# Patient Record
Sex: MALE | Race: WHITE | Employment: FULL TIME | URBAN - METROPOLITAN AREA
[De-identification: names, ages, dates, MRNs, and addresses within clinical notes are randomized per-mention and may not be internally consistent; named-entity substitution may affect disease eponyms.]

---

## 2021-11-15 ENCOUNTER — APPOINTMENT (OUTPATIENT)
Dept: CT IMAGING | Age: 46
End: 2021-11-15
Attending: EMERGENCY MEDICINE
Payer: COMMERCIAL

## 2021-11-15 ENCOUNTER — HOSPITAL ENCOUNTER (EMERGENCY)
Age: 46
Discharge: HOME | End: 2021-11-16
Attending: EMERGENCY MEDICINE
Payer: COMMERCIAL

## 2021-11-15 DIAGNOSIS — R10.10 UPPER ABDOMINAL PAIN: Primary | ICD-10-CM

## 2021-11-15 LAB
ABO + RH BLD: NORMAL
ALBUMIN SERPL-MCNC: 3.8 G/DL (ref 3.5–5)
ALBUMIN/GLOB SERPL: 1.1 {RATIO} (ref 1.2–3.5)
ALP SERPL-CCNC: 63 U/L (ref 50–136)
ALT SERPL-CCNC: 23 U/L (ref 12–65)
ANION GAP SERPL CALC-SCNC: 4 MMOL/L (ref 7–16)
AST SERPL-CCNC: 11 U/L (ref 15–37)
BASOPHILS # BLD: 0 K/UL (ref 0–0.2)
BASOPHILS NFR BLD: 1 % (ref 0–2)
BILIRUB SERPL-MCNC: 0.9 MG/DL (ref 0.2–1.1)
BLOOD GROUP ANTIBODIES SERPL: NORMAL
BUN SERPL-MCNC: 18 MG/DL (ref 6–23)
CALCIUM SERPL-MCNC: 8.3 MG/DL (ref 8.3–10.4)
CHLORIDE SERPL-SCNC: 105 MMOL/L (ref 98–107)
CO2 SERPL-SCNC: 30 MMOL/L (ref 21–32)
CREAT SERPL-MCNC: 0.91 MG/DL (ref 0.8–1.5)
DIFFERENTIAL METHOD BLD: ABNORMAL
EOSINOPHIL # BLD: 0.2 K/UL (ref 0–0.8)
EOSINOPHIL NFR BLD: 2 % (ref 0.5–7.8)
ERYTHROCYTE [DISTWIDTH] IN BLOOD BY AUTOMATED COUNT: 12.8 % (ref 11.9–14.6)
GLOBULIN SER CALC-MCNC: 3.4 G/DL (ref 2.3–3.5)
GLUCOSE SERPL-MCNC: 89 MG/DL (ref 65–100)
HCT VFR BLD AUTO: 43.3 % (ref 41.1–50.3)
HGB BLD-MCNC: 15 G/DL (ref 13.6–17.2)
IMM GRANULOCYTES # BLD AUTO: 0 K/UL (ref 0–0.5)
IMM GRANULOCYTES NFR BLD AUTO: 0 % (ref 0–5)
LIPASE SERPL-CCNC: 105 U/L (ref 73–393)
LYMPHOCYTES # BLD: 1.6 K/UL (ref 0.5–4.6)
LYMPHOCYTES NFR BLD: 22 % (ref 13–44)
MAGNESIUM SERPL-MCNC: 2.3 MG/DL (ref 1.8–2.4)
MCH RBC QN AUTO: 29.8 PG (ref 26.1–32.9)
MCHC RBC AUTO-ENTMCNC: 34.6 G/DL (ref 31.4–35)
MCV RBC AUTO: 86.1 FL (ref 79.6–97.8)
MONOCYTES # BLD: 0.7 K/UL (ref 0.1–1.3)
MONOCYTES NFR BLD: 10 % (ref 4–12)
NEUTS SEG # BLD: 4.7 K/UL (ref 1.7–8.2)
NEUTS SEG NFR BLD: 65 % (ref 43–78)
NRBC # BLD: 0 K/UL (ref 0–0.2)
PLATELET # BLD AUTO: 186 K/UL (ref 150–450)
PMV BLD AUTO: 9.1 FL (ref 9.4–12.3)
POTASSIUM SERPL-SCNC: 3.8 MMOL/L (ref 3.5–5.1)
PROT SERPL-MCNC: 7.2 G/DL (ref 6.3–8.2)
RBC # BLD AUTO: 5.03 M/UL (ref 4.23–5.6)
SODIUM SERPL-SCNC: 139 MMOL/L (ref 136–145)
SPECIMEN EXP DATE BLD: NORMAL
WBC # BLD AUTO: 7.1 K/UL (ref 4.3–11.1)

## 2021-11-15 PROCEDURE — 83690 ASSAY OF LIPASE: CPT

## 2021-11-15 PROCEDURE — 93005 ELECTROCARDIOGRAM TRACING: CPT | Performed by: EMERGENCY MEDICINE

## 2021-11-15 PROCEDURE — 71275 CT ANGIOGRAPHY CHEST: CPT

## 2021-11-15 PROCEDURE — 81003 URINALYSIS AUTO W/O SCOPE: CPT

## 2021-11-15 PROCEDURE — 74011000258 HC RX REV CODE- 258: Performed by: EMERGENCY MEDICINE

## 2021-11-15 PROCEDURE — 83735 ASSAY OF MAGNESIUM: CPT

## 2021-11-15 PROCEDURE — 80053 COMPREHEN METABOLIC PANEL: CPT

## 2021-11-15 PROCEDURE — 74011000636 HC RX REV CODE- 636: Performed by: EMERGENCY MEDICINE

## 2021-11-15 PROCEDURE — 99285 EMERGENCY DEPT VISIT HI MDM: CPT

## 2021-11-15 PROCEDURE — 85025 COMPLETE CBC W/AUTO DIFF WBC: CPT

## 2021-11-15 PROCEDURE — 86901 BLOOD TYPING SEROLOGIC RH(D): CPT

## 2021-11-15 RX ORDER — SODIUM CHLORIDE 0.9 % (FLUSH) 0.9 %
5-10 SYRINGE (ML) INJECTION EVERY 8 HOURS
Status: DISCONTINUED | OUTPATIENT
Start: 2021-11-15 | End: 2021-11-16 | Stop reason: HOSPADM

## 2021-11-15 RX ORDER — DICLOFENAC POTASSIUM 50 MG/1
50 TABLET, FILM COATED ORAL 3 TIMES DAILY
COMMUNITY

## 2021-11-15 RX ORDER — PETROLATUM,WHITE/LANOLIN
OINTMENT (GRAM) TOPICAL 3 TIMES DAILY
COMMUNITY

## 2021-11-15 RX ORDER — SODIUM CHLORIDE 0.9 % (FLUSH) 0.9 %
5-10 SYRINGE (ML) INJECTION AS NEEDED
Status: DISCONTINUED | OUTPATIENT
Start: 2021-11-15 | End: 2021-11-16 | Stop reason: HOSPADM

## 2021-11-15 RX ORDER — SODIUM CHLORIDE 0.9 % (FLUSH) 0.9 %
10 SYRINGE (ML) INJECTION
Status: COMPLETED | OUTPATIENT
Start: 2021-11-15 | End: 2021-11-15

## 2021-11-15 RX ADMIN — SODIUM CHLORIDE 100 ML: 900 INJECTION, SOLUTION INTRAVENOUS at 22:29

## 2021-11-15 RX ADMIN — Medication 10 ML: at 22:30

## 2021-11-15 RX ADMIN — IOPAMIDOL 100 ML: 755 INJECTION, SOLUTION INTRAVENOUS at 22:29

## 2021-11-16 VITALS
HEIGHT: 70 IN | OXYGEN SATURATION: 96 % | SYSTOLIC BLOOD PRESSURE: 115 MMHG | HEART RATE: 50 BPM | BODY MASS INDEX: 24.34 KG/M2 | WEIGHT: 170 LBS | DIASTOLIC BLOOD PRESSURE: 77 MMHG | TEMPERATURE: 97.9 F | RESPIRATION RATE: 16 BRPM

## 2021-11-16 LAB
ATRIAL RATE: 66 BPM
CALCULATED P AXIS, ECG09: 66 DEGREES
CALCULATED R AXIS, ECG10: -13 DEGREES
CALCULATED T AXIS, ECG11: 70 DEGREES
DIAGNOSIS, 93000: NORMAL
P-R INTERVAL, ECG05: 186 MS
Q-T INTERVAL, ECG07: 416 MS
QRS DURATION, ECG06: 94 MS
QTC CALCULATION (BEZET), ECG08: 436 MS
VENTRICULAR RATE, ECG03: 66 BPM

## 2021-11-16 PROCEDURE — 74011250636 HC RX REV CODE- 250/636: Performed by: EMERGENCY MEDICINE

## 2021-11-16 RX ADMIN — SODIUM CHLORIDE 500 ML: 9 INJECTION, SOLUTION INTRAVENOUS at 00:05

## 2021-11-16 NOTE — ED TRIAGE NOTES
Pt c/o LUQ abd pain since ho 1500 with back spasm and dizziness. Pt reports history of ascending aortic aneurysm. Pt masked.

## 2021-11-16 NOTE — ED PROVIDER NOTES
Patient with history of ascending aorta aneurysm. He is followed longitudinally for this. 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 It was some radiation into his back. There is no overt ripping or tearing discomfort. Has a family history of vascular pathology with mother dying aneurysm related event. Patient niacin anginal type chest pain. No shortness of breath. No acute infectious changes. No coolness or pallor to extremity. PT also felt somewhat dizzy when this occurred. This denies any palpitations or sense of arrhythmia. The history is provided by the patient. Abdominal Pain   This is a new problem. The problem has been resolved. The pain is associated with an unknown factor. Pain location: Across upper abdomen. The pain is moderate. Pertinent negatives include no fever, no diarrhea, no hematochezia, no melena and no constipation. Nothing worsens the pain. Past Medical History:   Diagnosis Date    Ascending aortic aneurysm ([address]     Inguinal hernia     Umbilical hernia        Past Surgical History:   Procedure Laterality Date    HX HERNIA REPAIR      HX ORTHOPAEDIC           History reviewed. No pertinent family history.     Social History     Socioeconomic History    Marital status:      Spouse name: Not on file    Number of children: Not on file    Years of education: Not on file    Highest education level: Not on file   Occupational History    Not on file   Tobacco Use    Smoking status: Never Smoker    Smokeless tobacco: Never Used   Substance and Sexual Activity    Alcohol use: Not on file    Drug use: Not on file    Sexual activity: Not on file   Other Topics Concern    Not on file   Social History Narrative    Not on file     Social Determinants of Health     Financial Resource Strain:     Difficulty of Paying Living Expenses: Not on file   Food Insecurity:     Worried About Running Out of Food in the Last Year: Not on file    Ran Out of Food in the Last Year: Not on file   Transportation Needs:     Lack of Transportation (Medical): Not on file    Lack of Transportation (Non-Medical): Not on file   Physical Activity:     Days of Exercise per Week: Not on file    Minutes of Exercise per Session: Not on file   Stress:     Feeling of Stress : Not on file   Social Connections:     Frequency of Communication with Friends and Family: Not on file    Frequency of Social Gatherings with Friends and Family: Not on file    Attends Muslim Services: Not on file    Active Member of [address] [place] [address] GroupSpaces or Organizations: Not on file    Attends Club or Organization Meetings: Not on file    Marital Status: Not on file   Intimate Partner Violence:     Fear of Current or Ex-Partner: Not on file    Emotionally Abused: Not on file    Physically Abused: Not on file    Sexually Abused: Not on file   Housing Stability:     Unable to Pay for Housing in the Last Year: Not on file    Number of [place] in the Last Year: Not on file    Unstable Housing in the Last Year: Not on file         ALLERGIES: Patient has no known allergies. Review of Systems   Constitutional: Negative for fever. Gastrointestinal: Positive for abdominal pain. Negative for constipation, diarrhea, hematochezia and melena. Genitourinary: Negative. Musculoskeletal: Negative. All other systems reviewed and are negative. Vitals:    11/15/21 2106 11/15/21 2133 11/15/21 2149 11/15/21 2152   BP: (!) 167/96  (!) 128/92 122/82   Pulse: 65  (!) 58 (!) 56   Resp: 16   21   Temp: 97.9 °F (36.6 °C)      SpO2: 96% 97% 97% 96%   Weight: 77.1 kg (170 lb)      Height: 5' 10\" (1.778 m)               Physical Exam  Vitals and nursing note reviewed. Constitutional:       General: He is not in acute distress. Appearance: He is well-developed.       Comments: Pleasantly conversant with no acute extremis   HENT:      Head: Atraumatic. Mouth/Throat:      Mouth: Mucous membranes are moist.   Eyes:      General: No scleral icterus. Cardiovascular:      Rate and Rhythm: Normal rate and regular rhythm. Pulses:           Radial pulses are 2+ on the right side and 2+ on the left side. Dorsalis pedis pulses are 2+ on the right side and 2+ on the left side. Heart sounds: Normal heart sounds. No murmur heard. No friction rub. Pulmonary:      Effort: Pulmonary effort is normal. No respiratory distress. Breath sounds: Normal breath sounds. Abdominal:      General: Abdomen is flat. Bowel sounds are normal. There is no distension. Palpations: Abdomen is soft. Tenderness: There is no right CVA tenderness, left CVA tenderness, guarding or rebound. Hernia: No hernia is present. Musculoskeletal:      Cervical back: Neck supple. Skin:     General: Skin is warm and dry. Capillary Refill: Capillary refill takes less than 2 seconds. Neurological:      General: No focal deficit present. Psychiatric:         Mood and Affect: Mood normal.         Behavior: Behavior normal.         Thought Content: Thought content normal.          MDM  Number of Diagnoses or Management Options  Upper abdominal pain  Diagnosis management comments: Has concerns regarding aneurysmal event. Somewhat atypical presentation but appropriate concern. No anginal type component. Patient is very physically active had no recent exertional issues. No significant chest pain arm pain jaw pain diaphoresis or shortness of breath. Patient has lab work that is stable. He has symmetrical pulses. He has no palpable abnormality or abnormal pulsation to the abdomen. Imaging shows no acute dissection.        Amount and/or Complexity of Data Reviewed  Clinical lab tests: ordered and reviewed  Tests in the radiology section of CPT®: reviewed and ordered  Independent visualization of images, tracings, or specimens: yes    Risk of Complications, Morbidity, and/or Mortality  Presenting problems: moderate  Diagnostic procedures: moderate  Management options: moderate           Procedures

## 2021-11-16 NOTE — ED NOTES
I have reviewed discharge instructions with the patient. The patient verbalized understanding. Patient left ED via Discharge Method: ambulatory to Home with Wife. Opportunity for questions and clarification provided. Patient given 0 scripts. To continue your aftercare when you leave the hospital, you may receive an automated call from our care team to check in on how you are doing. This is a free service and part of our promise to provide the best care and service to meet your aftercare needs.  If you have questions, or wish to unsubscribe from this service please call [phone]. Thank you for Choosing our [facility] Emergency Department.

## 2021-11-16 NOTE — DISCHARGE INSTRUCTIONS
Follow-up with your regular doctor  Recheck with any new or progressive problems or failure to improve or with any significant or worrisome symptoms    Lab[de-identified]  Recent Results (from the past 12 hour(s))   CBC WITH AUTOMATED DIFF    Collection Time: 11/15/21  9:21 PM   Result Value Ref Range    WBC 7.1 4.3 - 11.1 K/uL    RBC 5.03 4.23 - 5.6 M/uL    HGB 15.0 13.6 - 17.2 g/dL    HCT 43.3 41.1 - 50.3 %    MCV 86.1 79.6 - 97.8 FL    MCH 29.8 26.1 - 32.9 PG    MCHC 34.6 31.4 - 35.0 g/dL    RDW 12.8 11.9 - 14.6 %    PLATELET 519 396 - 686 K/uL    MPV 9.1 (L) 9.4 - 12.3 FL    ABSOLUTE NRBC 0.00 0.0 - 0.2 K/uL    NEUTROPHILS 65 43 - 78 %    LYMPHOCYTES 22 13 - 44 %    MONOCYTES 10 4.0 - 12.0 %    EOSINOPHILS 2 0.5 - 7.8 %    BASOPHILS 1 0.0 - 2.0 %    IMMATURE GRANULOCYTES 0 0.0 - 5.0 %    ABS. NEUTROPHILS 4.7 1.7 - 8.2 K/UL    ABS. LYMPHOCYTES 1.6 0.5 - 4.6 K/UL    ABS. MONOCYTES 0.7 0.1 - 1.3 K/UL    ABS. EOSINOPHILS 0.2 0.0 - 0.8 K/UL    ABS. BASOPHILS 0.0 0.0 - 0.2 K/UL    ABS. IMM. GRANS. 0.0 0.0 - 0.5 K/UL    DF AUTOMATED     METABOLIC PANEL, COMPREHENSIVE    Collection Time: 11/15/21  9:21 PM   Result Value Ref Range    Sodium 139 136 - 145 mmol/L    Potassium 3.8 3.5 - 5.1 mmol/L    Chloride 105 98 - 107 mmol/L    CO2 30 21 - 32 mmol/L    Anion gap 4 (L) 7 - 16 mmol/L    Glucose 89 65 - 100 mg/dL    BUN 18 6 - 23 MG/DL    Creatinine 0.91 0.8 - 1.5 MG/DL    GFR est AA >60 >60 ml/min/1.73m2    GFR est non-AA >60 >60 ml/min/1.73m2    Calcium 8.3 8.3 - 10.4 MG/DL    Bilirubin, total 0.9 0.2 - 1.1 MG/DL    ALT (SGPT) 23 12 - 65 U/L    AST (SGOT) 11 (L) 15 - 37 U/L    Alk.  phosphatase 63 50 - 136 U/L    Protein, total 7.2 6.3 - 8.2 g/dL    Albumin 3.8 3.5 - 5.0 g/dL    Globulin 3.4 2.3 - 3.5 g/dL    A-G Ratio 1.1 (L) 1.2 - 3.5     LIPASE    Collection Time: 11/15/21  9:21 PM   Result Value Ref Range    Lipase 105 73 - 393 U/L   MAGNESIUM    Collection Time: 11/15/21  9:21 PM   Result Value Ref Range    Magnesium 2.3 1.8 - 2.4 mg/dL   EKG, 12 LEAD, INITIAL    Collection Time: 11/15/21  9:21 PM   Result Value Ref Range    Ventricular Rate 66 BPM    Atrial Rate 66 BPM    P-R Interval 186 ms    QRS Duration 94 ms    Q-T Interval 416 ms    QTC Calculation (Bezet) 436 ms    Calculated P Axis 66 degrees    Calculated R Axis -13 degrees    Calculated T Axis 70 degrees    Diagnosis       Normal sinus rhythm  Possible Left atrial enlargement  Possible Anterior infarct , age undetermined  Abnormal ECG  No previous ECGs available     TYPE & SCREEN    Collection Time: 11/15/21 10:20 PM   Result Value Ref Range    Crossmatch Expiration 11/18/2021,2359     ABO/Rh(D) A POSITIVE     Antibody screen NEG

## 2023-01-30 ENCOUNTER — TELEPHONE (OUTPATIENT)
Dept: FAMILY MEDICINE CLINIC | Facility: CLINIC | Age: 48
End: 2023-01-30

## 2023-01-30 NOTE — TELEPHONE ENCOUNTER
----- Message from Crystal Hanley sent at 1/30/2023  1:57 PM EST -----  Subject: Message to Provider    QUESTIONS  Information for Provider? PT is a former pt of Jr Vail from the [place]   and is looking to est care with her at the [facility]. Please follow up with PT to let him know if she will accept him as a PT   and if not if there is a wait list he can be added to.   ---------------------------------------------------------------------------  --------------  [employer]  [phone]; OK to leave message on voicemail,OK to respond with electronic   message via [email or web address] portal (only for patients who have registered [email or web address]   account)  ---------------------------------------------------------------------------  --------------  SCRIPT ANSWERS  Relationship to Patient?  Self

## 2023-02-27 ENCOUNTER — OFFICE VISIT (OUTPATIENT)
Dept: FAMILY MEDICINE CLINIC | Facility: CLINIC | Age: 48
End: 2023-02-27
Payer: COMMERCIAL

## 2023-02-27 VITALS
DIASTOLIC BLOOD PRESSURE: 84 MMHG | BODY MASS INDEX: 24.6 KG/M2 | HEIGHT: 70 IN | WEIGHT: 171.8 LBS | SYSTOLIC BLOOD PRESSURE: 119 MMHG | HEART RATE: 71 BPM | TEMPERATURE: 97.2 F | OXYGEN SATURATION: 98 %

## 2023-02-27 DIAGNOSIS — Z00.00 ROUTINE GENERAL MEDICAL EXAMINATION AT A HEALTH CARE FACILITY: Primary | ICD-10-CM

## 2023-02-27 DIAGNOSIS — E03.9 ACQUIRED HYPOTHYROIDISM: ICD-10-CM

## 2023-02-27 DIAGNOSIS — J45.20 MILD INTERMITTENT ASTHMA WITHOUT COMPLICATION: ICD-10-CM

## 2023-02-27 DIAGNOSIS — Z12.11 SCREENING FOR MALIGNANT NEOPLASM OF COLON: ICD-10-CM

## 2023-02-27 DIAGNOSIS — I71.21 ANEURYSM OF ASCENDING AORTA WITHOUT RUPTURE: ICD-10-CM

## 2023-02-27 LAB
ALBUMIN SERPL-MCNC: 4.1 G/DL (ref 3.5–5)
ALBUMIN/GLOB SERPL: 1.2 (ref 0.4–1.6)
ALP SERPL-CCNC: 60 U/L (ref 50–136)
ALT SERPL-CCNC: 33 U/L (ref 12–65)
ANION GAP SERPL CALC-SCNC: 5 MMOL/L (ref 2–11)
AST SERPL-CCNC: 23 U/L (ref 15–37)
BILIRUB SERPL-MCNC: 1 MG/DL (ref 0.2–1.1)
BUN SERPL-MCNC: 16 MG/DL (ref 6–23)
CALCIUM SERPL-MCNC: 9.5 MG/DL (ref 8.3–10.4)
CHLORIDE SERPL-SCNC: 105 MMOL/L (ref 101–110)
CHOLEST SERPL-MCNC: 159 MG/DL
CO2 SERPL-SCNC: 26 MMOL/L (ref 21–32)
CREAT SERPL-MCNC: 0.9 MG/DL (ref 0.8–1.5)
GLOBULIN SER CALC-MCNC: 3.3 G/DL (ref 2.8–4.5)
GLUCOSE SERPL-MCNC: 118 MG/DL (ref 65–100)
HDLC SERPL-MCNC: 49 MG/DL (ref 40–60)
HDLC SERPL: 3.2
LDLC SERPL CALC-MCNC: 90.6 MG/DL
POTASSIUM SERPL-SCNC: 3.7 MMOL/L (ref 3.5–5.1)
PROT SERPL-MCNC: 7.4 G/DL (ref 6.3–8.2)
SODIUM SERPL-SCNC: 136 MMOL/L (ref 133–143)
T4 FREE SERPL-MCNC: 1 NG/DL (ref 0.78–1.46)
TRIGL SERPL-MCNC: 97 MG/DL (ref 35–150)
TSH, 3RD GENERATION: 4.7 UIU/ML (ref 0.36–3.74)
VLDLC SERPL CALC-MCNC: 19.4 MG/DL (ref 6–23)

## 2023-02-27 PROCEDURE — 99386 PREV VISIT NEW AGE 40-64: CPT | Performed by: FAMILY MEDICINE

## 2023-02-27 RX ORDER — DICLOFENAC POTASSIUM 50 MG/1
50 TABLET, FILM COATED ORAL 3 TIMES DAILY
Qty: 90 TABLET | Refills: 3 | Status: SHIPPED | OUTPATIENT
Start: 2023-02-27

## 2023-02-27 RX ORDER — LEVOTHYROXINE SODIUM 0.07 MG/1
75 TABLET ORAL DAILY
COMMUNITY

## 2023-02-27 RX ORDER — ALBUTEROL SULFATE 90 UG/1
2 AEROSOL, METERED RESPIRATORY (INHALATION) 4 TIMES DAILY PRN
Qty: 54 G | Refills: 5 | Status: SHIPPED | OUTPATIENT
Start: 2023-02-27

## 2023-02-27 SDOH — ECONOMIC STABILITY: FOOD INSECURITY: WITHIN THE PAST 12 MONTHS, THE FOOD YOU BOUGHT JUST DIDN'T LAST AND YOU DIDN'T HAVE MONEY TO GET MORE.: NEVER TRUE

## 2023-02-27 SDOH — ECONOMIC STABILITY: FOOD INSECURITY: WITHIN THE PAST 12 MONTHS, YOU WORRIED THAT YOUR FOOD WOULD RUN OUT BEFORE YOU GOT MONEY TO BUY MORE.: NEVER TRUE

## 2023-02-27 SDOH — ECONOMIC STABILITY: HOUSING INSECURITY
IN THE LAST 12 MONTHS, WAS THERE A TIME WHEN YOU DID NOT HAVE A STEADY PLACE TO SLEEP OR SLEPT IN A SHELTER (INCLUDING NOW)?: NO

## 2023-02-27 SDOH — ECONOMIC STABILITY: INCOME INSECURITY: HOW HARD IS IT FOR YOU TO PAY FOR THE VERY BASICS LIKE FOOD, HOUSING, MEDICAL CARE, AND HEATING?: NOT HARD AT ALL

## 2023-02-27 ASSESSMENT — ENCOUNTER SYMPTOMS
SHORTNESS OF BREATH: 0
BACK PAIN: 0
ABDOMINAL PAIN: 0
WHEEZING: 0
CHEST TIGHTNESS: 0
NAUSEA: 0
CONSTIPATION: 0
VOMITING: 0
DIARRHEA: 0
COUGH: 0

## 2023-02-27 ASSESSMENT — PATIENT HEALTH QUESTIONNAIRE - PHQ9
SUM OF ALL RESPONSES TO PHQ QUESTIONS 1-9: 0
2. FEELING DOWN, DEPRESSED OR HOPELESS: 0
SUM OF ALL RESPONSES TO PHQ QUESTIONS 1-9: 0
SUM OF ALL RESPONSES TO PHQ9 QUESTIONS 1 & 2: 0
1. LITTLE INTEREST OR PLEASURE IN DOING THINGS: 0

## 2023-02-27 ASSESSMENT — ANXIETY QUESTIONNAIRES
2. NOT BEING ABLE TO STOP OR CONTROL WORRYING: 0
5. BEING SO RESTLESS THAT IT IS HARD TO SIT STILL: 0
1. FEELING NERVOUS, ANXIOUS, OR ON EDGE: 0
4. TROUBLE RELAXING: 0
IF YOU CHECKED OFF ANY PROBLEMS ON THIS QUESTIONNAIRE, HOW DIFFICULT HAVE THESE PROBLEMS MADE IT FOR YOU TO DO YOUR WORK, TAKE CARE OF THINGS AT HOME, OR GET ALONG WITH OTHER PEOPLE: NOT DIFFICULT AT ALL
3. WORRYING TOO MUCH ABOUT DIFFERENT THINGS: 0
GAD7 TOTAL SCORE: 0
6. BECOMING EASILY ANNOYED OR IRRITABLE: 0
7. FEELING AFRAID AS IF SOMETHING AWFUL MIGHT HAPPEN: 0

## 2023-02-27 NOTE — PROGRESS NOTES
[facility]  [address]  Phone [phone]  Fax:  [phone]    Patient: Joo Allred  YOB: 1975  Patient Age 50 y.o. Patient sex: male  Medical Record:  [id number]  Visit Date: 02/27/23    [facility] Note     Chief Complaint   Patient presents with    New Patient     Pt following up with Dr Chaya Edmondson from [facility]       History of Present Illness:  Pt is here for new pt. Very active and healthy 51 y/o male. He does have a hereditary Ascending AA. Going for surg on Jun 1. Was seen in Nov at [facility]. Has Positive gene for ascending aortic aneurysm. Sister had it done last  Summer. She is doing well. They did another CT at last visit to [facility] and it was 4. 1.cm. Seen by Dr Alfonso San with CV/vascular clinic. Hypothyroidism - No recent labs. Out of med now. Last taken a mos ago. Asthma - allergy to chlorine  - some wheezing. Using albuterol inhaler prn. Would like a renewal.     Eyes - [facility] [place] [facility] in [address] is swimming about 2 days a week. Playing golf. Discussed limiting lifting - any valsalva         Allergies: Allergies   Allergen Reactions    Chlorine Shortness Of Breath       Current Medications:   Medications marked \"taking\" at this time:    Current Outpatient Medications:     levothyroxine (SYNTHROID) 75 MCG tablet, Take 75 mcg by mouth Daily, Disp: , Rfl:     diclofenac (CATAFLAM) 50 MG tablet, Take 1 tablet by mouth 3 times daily, Disp: 90 tablet, Rfl: 3    albuterol sulfate HFA (VENTOLIN HFA) 108 (90 Base) MCG/ACT inhaler, Inhale 2 puffs into the lungs 4 times daily as needed for Wheezing, Disp: 54 g, Rfl: 5    Glucosamine 500 MG CAPS, Take by mouth 3 times daily, Disp: , Rfl:     Current Problem List: There is no problem list on file for this patient.       Social History:   Social History     Tobacco Use    Smoking status: Never     Passive exposure: Current    Smokeless tobacco: Never Substance Use Topics    Alcohol use: Yes     Alcohol/week: 2.0 standard drinks     Types: 2 Cans of beer per week     Comment: 2 beers a week maybe       Family History: History reviewed. No pertinent family history. Surgical History:  Past Surgical History:   Procedure Laterality Date    HERNIA REPAIR      ORTHOPEDIC SURGERY         ROS  Review of Systems   Constitutional: Negative. HENT: Negative. Eyes:  Negative for visual disturbance. Respiratory:  Negative for cough, chest tightness, shortness of breath and wheezing. Cardiovascular:  Negative for chest pain and palpitations. Gastrointestinal:  Negative for abdominal pain, constipation, diarrhea, nausea and vomiting. Genitourinary:  Negative for hematuria. Musculoskeletal:  Negative for back pain, gait problem and joint swelling. Skin: Negative. Allergic/Immunologic: Negative for environmental allergies. Neurological:  Negative for dizziness and headaches. Hematological: Negative. Visit Vitals  /84   Pulse 71   Temp 97.2 °F (36.2 °C)   Ht 5' 10\" (1.778 m)   Wt 171 lb 12.8 oz (77.9 kg)   SpO2 98%   BMI 24.65 kg/m²       Physical Exam  Physical Exam  Vitals reviewed. Constitutional:       Appearance: Normal appearance. HENT:      Head: Normocephalic and atraumatic. Right Ear: Tympanic membrane normal.      Left Ear: Tympanic membrane normal.      Nose: Nose normal.   Eyes:      Pupils: Pupils are equal, round, and reactive to light. Neck:      Vascular: No carotid bruit. Cardiovascular:      Rate and Rhythm: Normal rate and regular rhythm. Heart sounds: Normal heart sounds. Pulmonary:      Effort: Pulmonary effort is normal.      Breath sounds: Normal breath sounds. Abdominal:      General: Abdomen is flat. Bowel sounds are normal.      Palpations: Abdomen is soft. Musculoskeletal:         General: No swelling. Normal range of motion. Cervical back: Normal range of motion and neck supple. Lymphadenopathy:      Cervical: No cervical adenopathy. Skin:     General: Skin is warm and dry. Neurological:      General: No focal deficit present. Mental Status: He is alert. Mental status is at baseline. Psychiatric:         Mood and Affect: Mood normal.         ASSESSMENT & PLAN      I have reviewed the patient's past medical history, social history and family history and vitals. We have discussed treatment plan and follow up and given patient instructions. Patient's questions are answered and we will follow up as indicated. Rica Cranker was seen today for new patient. Diagnoses and all orders for this visit:    Routine general medical examination at a health care facility -   Labs today. Exercise - good  Diet Good. Limits sweets and fried foods   Imms UTD  Due for colon ca screening   Eyes - seeing [facility] eye   -     Comprehensive Metabolic Panel; Future  -     Lipid Panel; Future  -     Lipid Panel  -     Comprehensive Metabolic Panel    Aneurysm of ascending aorta without rupture-planning on surgery with an aneurysm of 4.1 cm. Patient is going to [facility] clinic in June to have this repaired. It is a hereditary type of aneurysm which his son also has. Discussed limiting any heavy Valsalva maneuvers when exercising as patient is an avid athlete    Acquired hypothyroidism-he has been out of his medication now for about a month. We will check his labs today to see if he needs his medication.  -     T4, Free; Future  -     TSH; Future  -     T4, Free  -     TSH    Screening for malignant neoplasm of colon  -     Cologuard (Fecal DNA Colorectal Cancer Screening)    Mild intermittent asthma without complication -patient does swim a lot. He does have an allergy to chlorine and does get wheezing when in the pool with heavy chlorine. -     albuterol sulfate HFA (VENTOLIN HFA) 108 (90 Base) MCG/ACT inhaler;  Inhale 2 puffs into the lungs 4 times daily as needed for Wheezing    Other orders  - diclofenac (CATAFLAM) 50 MG tablet; Take 1 tablet by mouth 3 times daily       Return in about 6 months (around 8/27/2023) for 6 mos f/u.          Michelle Guerra MD

## 2023-03-29 LAB — NONINV COLON CA DNA+OCC BLD SCRN STL QL: NEGATIVE

## 2023-06-28 ENCOUNTER — TELEPHONE (OUTPATIENT)
Dept: FAMILY MEDICINE CLINIC | Facility: CLINIC | Age: 48
End: 2023-06-28

## 2023-07-18 ENCOUNTER — OFFICE VISIT (OUTPATIENT)
Dept: FAMILY MEDICINE CLINIC | Facility: CLINIC | Age: 48
End: 2023-07-18
Payer: COMMERCIAL

## 2023-07-18 VITALS
OXYGEN SATURATION: 98 % | HEART RATE: 102 BPM | BODY MASS INDEX: 22.59 KG/M2 | DIASTOLIC BLOOD PRESSURE: 60 MMHG | WEIGHT: 157.8 LBS | HEIGHT: 70 IN | SYSTOLIC BLOOD PRESSURE: 102 MMHG | TEMPERATURE: 98.2 F

## 2023-07-18 DIAGNOSIS — Z98.890 S/P ASCENDING AORTIC ANEURYSM REPAIR: Primary | ICD-10-CM

## 2023-07-18 DIAGNOSIS — Z09 HOSPITAL DISCHARGE FOLLOW-UP: ICD-10-CM

## 2023-07-18 DIAGNOSIS — F41.9 ANXIETY: ICD-10-CM

## 2023-07-18 DIAGNOSIS — Z86.79 S/P ASCENDING AORTIC ANEURYSM REPAIR: Primary | ICD-10-CM

## 2023-07-18 LAB
ALBUMIN SERPL-MCNC: 3.2 G/DL (ref 3.5–5)
ALBUMIN/GLOB SERPL: 0.8 (ref 0.4–1.6)
ALP SERPL-CCNC: 77 U/L (ref 50–136)
ALT SERPL-CCNC: 44 U/L (ref 12–65)
ANION GAP SERPL CALC-SCNC: 2 MMOL/L (ref 2–11)
AST SERPL-CCNC: 17 U/L (ref 15–37)
BASOPHILS # BLD: 0.1 K/UL (ref 0–0.2)
BASOPHILS NFR BLD: 2 % (ref 0–2)
BILIRUB SERPL-MCNC: 0.4 MG/DL (ref 0.2–1.1)
BUN SERPL-MCNC: 14 MG/DL (ref 6–23)
CALCIUM SERPL-MCNC: 9.1 MG/DL (ref 8.3–10.4)
CHLORIDE SERPL-SCNC: 107 MMOL/L (ref 101–110)
CO2 SERPL-SCNC: 29 MMOL/L (ref 21–32)
CREAT SERPL-MCNC: 0.9 MG/DL (ref 0.8–1.5)
DIFFERENTIAL METHOD BLD: ABNORMAL
EOSINOPHIL # BLD: 0.5 K/UL (ref 0–0.8)
EOSINOPHIL NFR BLD: 8 % (ref 0.5–7.8)
ERYTHROCYTE [DISTWIDTH] IN BLOOD BY AUTOMATED COUNT: 12.3 % (ref 11.9–14.6)
GLOBULIN SER CALC-MCNC: 4.2 G/DL (ref 2.8–4.5)
GLUCOSE SERPL-MCNC: 87 MG/DL (ref 65–100)
HCT VFR BLD AUTO: 36.8 % (ref 41.1–50.3)
HGB BLD-MCNC: 12 G/DL (ref 13.6–17.2)
IMM GRANULOCYTES # BLD AUTO: 0 K/UL (ref 0–0.5)
IMM GRANULOCYTES NFR BLD AUTO: 1 % (ref 0–5)
LYMPHOCYTES # BLD: 1.3 K/UL (ref 0.5–4.6)
LYMPHOCYTES NFR BLD: 22 % (ref 13–44)
MCH RBC QN AUTO: 30 PG (ref 26.1–32.9)
MCHC RBC AUTO-ENTMCNC: 32.6 G/DL (ref 31.4–35)
MCV RBC AUTO: 92 FL (ref 82–102)
MONOCYTES # BLD: 0.6 K/UL (ref 0.1–1.3)
MONOCYTES NFR BLD: 10 % (ref 4–12)
NEUTS SEG # BLD: 3.5 K/UL (ref 1.7–8.2)
NEUTS SEG NFR BLD: 57 % (ref 43–78)
NRBC # BLD: 0 K/UL (ref 0–0.2)
PLATELET # BLD AUTO: 428 K/UL (ref 150–450)
PMV BLD AUTO: 8.6 FL (ref 9.4–12.3)
POTASSIUM SERPL-SCNC: 4.4 MMOL/L (ref 3.5–5.1)
PROT SERPL-MCNC: 7.4 G/DL (ref 6.3–8.2)
RBC # BLD AUTO: 4 M/UL (ref 4.23–5.6)
SODIUM SERPL-SCNC: 138 MMOL/L (ref 133–143)
WBC # BLD AUTO: 6 K/UL (ref 4.3–11.1)

## 2023-07-18 PROCEDURE — 1111F DSCHRG MED/CURRENT MED MERGE: CPT | Performed by: FAMILY MEDICINE

## 2023-07-18 PROCEDURE — 99214 OFFICE O/P EST MOD 30 MIN: CPT | Performed by: FAMILY MEDICINE

## 2023-07-18 RX ORDER — SERTRALINE HYDROCHLORIDE 25 MG/1
25 TABLET, FILM COATED ORAL DAILY
Qty: 60 TABLET | Refills: 1 | Status: SHIPPED | OUTPATIENT
Start: 2023-07-18 | End: 2023-09-16

## 2023-07-18 RX ORDER — ASPIRIN 81 MG
TABLET,CHEWABLE ORAL
COMMUNITY
Start: 2023-07-05 | End: 2023-07-18 | Stop reason: SDUPTHER

## 2023-07-18 RX ORDER — ASPIRIN 81 MG
81 TABLET,CHEWABLE ORAL DAILY
Qty: 90 TABLET | Refills: 3 | Status: SHIPPED | OUTPATIENT
Start: 2023-07-18

## 2023-07-18 RX ORDER — OXYCODONE HYDROCHLORIDE 5 MG/1
5 CAPSULE ORAL EVERY 4 HOURS PRN
COMMUNITY

## 2023-07-18 RX ORDER — CLOPIDOGREL BISULFATE 75 MG/1
TABLET ORAL
COMMUNITY
Start: 2023-07-05 | End: 2023-07-18 | Stop reason: SDUPTHER

## 2023-07-18 RX ORDER — CLOPIDOGREL BISULFATE 75 MG/1
75 TABLET ORAL DAILY
Qty: 30 TABLET | Refills: 2 | Status: SHIPPED | OUTPATIENT
Start: 2023-07-18

## 2023-07-18 RX ORDER — LANOLIN ALCOHOL/MO/W.PET/CERES
3 CREAM (GRAM) TOPICAL DAILY
COMMUNITY

## 2023-07-18 RX ORDER — ACETAMINOPHEN 325 MG/1
650 TABLET ORAL EVERY 4 HOURS PRN
COMMUNITY

## 2023-07-18 ASSESSMENT — ENCOUNTER SYMPTOMS
SHORTNESS OF BREATH: 0
CONSTIPATION: 0
WHEEZING: 0
DIARRHEA: 0
CHEST TIGHTNESS: 0
COUGH: 0

## 2023-07-18 NOTE — PROGRESS NOTES
date 7/18/2023 I have spent 60 minutes reviewing previous notes, test results and face to face with the patient discussing the diagnosis and importance of compliance with the treatment plan as well as documenting on the day of the visit. Subjective:   HPI:  Follow up of Hospital problems/diagnosis(es): Repair of ascending aortic aneurysm     Inpatient course: Discharge summary reviewed- see chart. Interval history/Current status: Doing well. Having a hard time being still and not working and doing things all the time. He is a person who likes to be active. Incision in chest healed wall. No pain. No pain meds currentlly     There is no problem list on file for this patient. Medications listed as ordered at the time of discharge from hospital     Medication List            Accurate as of July 18, 2023 11:03 PM. If you have any questions, ask your nurse or doctor. START taking these medications      sertraline 25 MG tablet  Commonly known as: Zoloft  Take 1 tablet by mouth daily Increase to 2 tablets daily after 2 weeks.   Started by: Shine Moody MD            CHANGE how you take these medications      Aspirin Low Dose 81 MG chewable tablet  Generic drug: aspirin  Take 1 tablet by mouth daily  What changed:   how much to take  how to take this  when to take this  Changed by: Shine Moody MD     clopidogrel 75 MG tablet  Commonly known as: PLAVIX  Take 1 tablet by mouth daily  What changed:   how much to take  how to take this  when to take this  Changed by: Shine Moody MD     metoprolol tartrate 25 MG tablet  Commonly known as: LOPRESSOR  Take 0.5 tablets by mouth 2 times daily  What changed: how to take this  Changed by: Shine Moody MD            CONTINUE taking these medications      acetaminophen 325 MG tablet  Commonly known as: TYLENOL     albuterol sulfate  (90 Base) MCG/ACT inhaler  Commonly known as: Ventolin HFA  Inhale 2 puffs into the lungs 4 times daily as

## 2023-07-25 ENCOUNTER — HOSPITAL ENCOUNTER (OUTPATIENT)
Dept: GENERAL RADIOLOGY | Age: 48
Discharge: HOME | End: 2023-07-28
Payer: COMMERCIAL

## 2023-07-25 DIAGNOSIS — Z86.79 S/P ASCENDING AORTIC ANEURYSM REPAIR: ICD-10-CM

## 2023-07-25 DIAGNOSIS — Z98.890 S/P ASCENDING AORTIC ANEURYSM REPAIR: ICD-10-CM

## 2023-07-25 PROCEDURE — 71046 X-RAY EXAM CHEST 2 VIEWS: CPT

## 2023-08-07 DIAGNOSIS — Z98.890 S/P ASCENDING AORTIC ANEURYSM REPAIR: Primary | ICD-10-CM

## 2023-08-07 DIAGNOSIS — Z86.79 S/P ASCENDING AORTIC ANEURYSM REPAIR: Primary | ICD-10-CM

## 2023-08-07 RX ORDER — OXYCODONE HYDROCHLORIDE 5 MG/1
5 TABLET ORAL EVERY 8 HOURS PRN
Qty: 30 TABLET | Refills: 0 | Status: SHIPPED | OUTPATIENT
Start: 2023-08-07 | End: 2023-08-17

## 2023-08-07 NOTE — PROGRESS NOTES
Patient's wife sent a message regarding chest pain. S/p  having an ascending aortic aneurysm repair done at [facility]. Patient is active and probably doing too much at home. He is pulling weeds in the garden and developed chest pain where his scar is after gardening. He did have 2 chest tubes in. He does get some pulling sensation where he had the chest tubes on occasion. Pt taking tylenol and not helping with pain. Nausea on norco.  Will cont oxycodone 5 mg but Q8h now - x 10 days. #30 tabs to take prn.

## 2023-08-26 DIAGNOSIS — Z98.890 S/P ASCENDING AORTIC ANEURYSM REPAIR: ICD-10-CM

## 2023-08-26 DIAGNOSIS — Z86.79 S/P ASCENDING AORTIC ANEURYSM REPAIR: ICD-10-CM

## 2023-08-26 RX ORDER — CLOPIDOGREL BISULFATE 75 MG/1
75 TABLET ORAL DAILY
Qty: 30 TABLET | Refills: 2 | Status: SHIPPED | OUTPATIENT
Start: 2023-08-26

## 2023-08-26 RX ORDER — CLOPIDOGREL BISULFATE 75 MG/1
75 TABLET ORAL DAILY
Qty: 30 TABLET | Refills: 2 | Status: SHIPPED | OUTPATIENT
Start: 2023-08-26 | End: 2023-08-26 | Stop reason: SDUPTHER

## 2023-09-18 ENCOUNTER — OFFICE VISIT (OUTPATIENT)
Dept: FAMILY MEDICINE CLINIC | Facility: CLINIC | Age: 48
End: 2023-09-18
Payer: COMMERCIAL

## 2023-09-18 VITALS
HEIGHT: 70 IN | TEMPERATURE: 99 F | WEIGHT: 165.5 LBS | OXYGEN SATURATION: 97 % | BODY MASS INDEX: 23.69 KG/M2 | DIASTOLIC BLOOD PRESSURE: 55 MMHG | SYSTOLIC BLOOD PRESSURE: 100 MMHG | HEART RATE: 93 BPM

## 2023-09-18 DIAGNOSIS — G54.5 PARSONAGE-TURNER SYNDROME: ICD-10-CM

## 2023-09-18 DIAGNOSIS — E03.9 HYPOTHYROIDISM, UNSPECIFIED TYPE: ICD-10-CM

## 2023-09-18 DIAGNOSIS — Z86.79 S/P ASCENDING AORTIC ANEURYSM REPAIR: Primary | ICD-10-CM

## 2023-09-18 DIAGNOSIS — Z98.890 S/P ASCENDING AORTIC ANEURYSM REPAIR: Primary | ICD-10-CM

## 2023-09-18 DIAGNOSIS — F41.9 ANXIETY: ICD-10-CM

## 2023-09-18 PROCEDURE — 99214 OFFICE O/P EST MOD 30 MIN: CPT | Performed by: FAMILY MEDICINE

## 2023-09-18 ASSESSMENT — ENCOUNTER SYMPTOMS
CHEST TIGHTNESS: 0
CONSTIPATION: 0
DIARRHEA: 0
WHEEZING: 0
SHORTNESS OF BREATH: 0
COUGH: 0

## 2023-09-18 ASSESSMENT — PATIENT HEALTH QUESTIONNAIRE - PHQ9
SUM OF ALL RESPONSES TO PHQ QUESTIONS 1-9: 0
2. FEELING DOWN, DEPRESSED OR HOPELESS: 0
SUM OF ALL RESPONSES TO PHQ9 QUESTIONS 1 & 2: 0
SUM OF ALL RESPONSES TO PHQ QUESTIONS 1-9: 0
1. LITTLE INTEREST OR PLEASURE IN DOING THINGS: 0

## 2023-09-18 NOTE — PROGRESS NOTES
neurology in the past.  He had an EMG completed but I do not have those records. He still has some weakness in that arm. He would like follow-up to see if there is anything further they can do in the arm.  -     [facility]    Hypothyroidism, unspecified type-TSH is elevated again. It is up to 6 from 4. We will check it again in a few months and if close to 10 we will start him on medication.  -     TSH; Future  -     T4, Free; Future     50% of this note was spent on counseling the patient regarding his anxiety. Return in about 1 year (around 9/18/2024) for Annual f/u. On this date 9/18/2023 I have spent 57 minutes reviewing previous notes, test results and face to face with the patient discussing the diagnosis and importance of compliance with the treatment plan as well as documenting on the day of the visit.     Ronan Ashton MD

## 2023-11-20 DIAGNOSIS — E03.9 HYPOTHYROIDISM, UNSPECIFIED TYPE: ICD-10-CM

## 2023-11-21 LAB
T4 FREE SERPL-MCNC: 0.9 NG/DL (ref 0.78–1.46)
TSH, 3RD GENERATION: 3.18 UIU/ML (ref 0.36–3.74)

## 2024-04-01 ENCOUNTER — APPOINTMENT (OUTPATIENT)
Dept: CT IMAGING | Age: 49
End: 2024-04-01
Payer: COMMERCIAL

## 2024-04-01 ENCOUNTER — HOSPITAL ENCOUNTER (EMERGENCY)
Age: 49
Discharge: ANOTHER HOSPITAL | End: 2024-04-02
Attending: EMERGENCY MEDICINE
Payer: COMMERCIAL

## 2024-04-01 DIAGNOSIS — I71.012 DESCENDING THORACIC AORTIC DISSECTION (HCC): Primary | ICD-10-CM

## 2024-04-01 LAB
ALBUMIN SERPL-MCNC: 3.8 G/DL (ref 3.5–5)
ALBUMIN/GLOB SERPL: 1.1 (ref 0.4–1.6)
ALP SERPL-CCNC: 70 U/L (ref 50–136)
ALT SERPL-CCNC: 26 U/L (ref 12–65)
ANION GAP SERPL CALC-SCNC: 9 MMOL/L (ref 2–11)
AST SERPL-CCNC: 25 U/L (ref 15–37)
BASOPHILS # BLD: 0.1 K/UL (ref 0–0.2)
BASOPHILS NFR BLD: 1 % (ref 0–2)
BILIRUB SERPL-MCNC: 0.9 MG/DL (ref 0.2–1.1)
BUN SERPL-MCNC: 29 MG/DL (ref 6–23)
CALCIUM SERPL-MCNC: 9.4 MG/DL (ref 8.3–10.4)
CHLORIDE SERPL-SCNC: 103 MMOL/L (ref 103–113)
CO2 SERPL-SCNC: 23 MMOL/L (ref 21–32)
CREAT SERPL-MCNC: 1.2 MG/DL (ref 0.8–1.5)
DIFFERENTIAL METHOD BLD: ABNORMAL
EOSINOPHIL # BLD: 0.1 K/UL (ref 0–0.8)
EOSINOPHIL NFR BLD: 1 % (ref 0.5–7.8)
ERYTHROCYTE [DISTWIDTH] IN BLOOD BY AUTOMATED COUNT: 12.7 % (ref 11.9–14.6)
GLOBULIN SER CALC-MCNC: 3.6 G/DL (ref 2.8–4.5)
GLUCOSE SERPL-MCNC: 131 MG/DL (ref 65–100)
HCT VFR BLD AUTO: 38.6 % (ref 41.1–50.3)
HCT VFR BLD AUTO: 42.8 % (ref 41.1–50.3)
HGB BLD-MCNC: 13.5 G/DL (ref 13.6–17.2)
HGB BLD-MCNC: 14.8 G/DL (ref 13.6–17.2)
IMM GRANULOCYTES # BLD AUTO: 0 K/UL (ref 0–0.5)
IMM GRANULOCYTES NFR BLD AUTO: 0 % (ref 0–5)
LIPASE SERPL-CCNC: 97 U/L (ref 73–393)
LYMPHOCYTES # BLD: 2.3 K/UL (ref 0.5–4.6)
LYMPHOCYTES NFR BLD: 22 % (ref 13–44)
MCH RBC QN AUTO: 29.7 PG (ref 26.1–32.9)
MCHC RBC AUTO-ENTMCNC: 34.6 G/DL (ref 31.4–35)
MCV RBC AUTO: 85.9 FL (ref 82–102)
MONOCYTES # BLD: 0.8 K/UL (ref 0.1–1.3)
MONOCYTES NFR BLD: 8 % (ref 4–12)
NEUTS SEG # BLD: 7.5 K/UL (ref 1.7–8.2)
NEUTS SEG NFR BLD: 68 % (ref 43–78)
NRBC # BLD: 0 K/UL (ref 0–0.2)
PLATELET # BLD AUTO: 240 K/UL (ref 150–450)
PMV BLD AUTO: 8.9 FL (ref 9.4–12.3)
POTASSIUM SERPL-SCNC: 3.4 MMOL/L (ref 3.5–5.1)
PROT SERPL-MCNC: 7.4 G/DL (ref 6.3–8.2)
RBC # BLD AUTO: 4.98 M/UL (ref 4.23–5.6)
SODIUM SERPL-SCNC: 135 MMOL/L (ref 136–146)
TROPONIN I SERPL HS-MCNC: 5 PG/ML (ref 0–14)
TROPONIN I SERPL HS-MCNC: 8.7 PG/ML (ref 0–14)
WBC # BLD AUTO: 10.9 K/UL (ref 4.3–11.1)

## 2024-04-01 PROCEDURE — 85018 HEMOGLOBIN: CPT

## 2024-04-01 PROCEDURE — 99223 1ST HOSP IP/OBS HIGH 75: CPT | Performed by: STUDENT IN AN ORGANIZED HEALTH CARE EDUCATION/TRAINING PROGRAM

## 2024-04-01 PROCEDURE — 96375 TX/PRO/DX INJ NEW DRUG ADDON: CPT

## 2024-04-01 PROCEDURE — 96365 THER/PROPH/DIAG IV INF INIT: CPT

## 2024-04-01 PROCEDURE — 96367 TX/PROPH/DG ADDL SEQ IV INF: CPT

## 2024-04-01 PROCEDURE — 83690 ASSAY OF LIPASE: CPT

## 2024-04-01 PROCEDURE — 99285 EMERGENCY DEPT VISIT HI MDM: CPT

## 2024-04-01 PROCEDURE — 85025 COMPLETE CBC W/AUTO DIFF WBC: CPT

## 2024-04-01 PROCEDURE — 2580000003 HC RX 258: Performed by: EMERGENCY MEDICINE

## 2024-04-01 PROCEDURE — 96366 THER/PROPH/DIAG IV INF ADDON: CPT

## 2024-04-01 PROCEDURE — 6360000004 HC RX CONTRAST MEDICATION: Performed by: EMERGENCY MEDICINE

## 2024-04-01 PROCEDURE — 74174 CTA ABD&PLVS W/CONTRAST: CPT

## 2024-04-01 PROCEDURE — 6360000002 HC RX W HCPCS: Performed by: EMERGENCY MEDICINE

## 2024-04-01 PROCEDURE — 84484 ASSAY OF TROPONIN QUANT: CPT

## 2024-04-01 PROCEDURE — 96376 TX/PRO/DX INJ SAME DRUG ADON: CPT

## 2024-04-01 PROCEDURE — 96374 THER/PROPH/DIAG INJ IV PUSH: CPT

## 2024-04-01 PROCEDURE — 80053 COMPREHEN METABOLIC PANEL: CPT

## 2024-04-01 PROCEDURE — 85014 HEMATOCRIT: CPT

## 2024-04-01 RX ORDER — LABETALOL HYDROCHLORIDE 5 MG/ML
10 INJECTION, SOLUTION INTRAVENOUS
Status: COMPLETED | OUTPATIENT
Start: 2024-04-01 | End: 2024-04-01

## 2024-04-01 RX ORDER — MORPHINE SULFATE 10 MG/ML
6 INJECTION, SOLUTION INTRAMUSCULAR; INTRAVENOUS
Status: COMPLETED | OUTPATIENT
Start: 2024-04-01 | End: 2024-04-01

## 2024-04-01 RX ORDER — 0.9 % SODIUM CHLORIDE 0.9 %
1000 INTRAVENOUS SOLUTION INTRAVENOUS
Status: COMPLETED | OUTPATIENT
Start: 2024-04-01 | End: 2024-04-01

## 2024-04-01 RX ORDER — ESMOLOL HYDROCHLORIDE 10 MG/ML
25-300 INJECTION, SOLUTION INTRAVENOUS CONTINUOUS
Status: DISCONTINUED | OUTPATIENT
Start: 2024-04-01 | End: 2024-04-02 | Stop reason: HOSPADM

## 2024-04-01 RX ORDER — ONDANSETRON 2 MG/ML
4 INJECTION INTRAMUSCULAR; INTRAVENOUS
Status: COMPLETED | OUTPATIENT
Start: 2024-04-01 | End: 2024-04-01

## 2024-04-01 RX ADMIN — MORPHINE SULFATE 6 MG: 10 INJECTION INTRAVENOUS at 23:15

## 2024-04-01 RX ADMIN — ONDANSETRON 4 MG: 2 INJECTION INTRAMUSCULAR; INTRAVENOUS at 20:26

## 2024-04-01 RX ADMIN — MORPHINE SULFATE 6 MG: 10 INJECTION INTRAVENOUS at 20:26

## 2024-04-01 RX ADMIN — IOPAMIDOL 100 ML: 755 INJECTION, SOLUTION INTRAVENOUS at 20:12

## 2024-04-01 RX ADMIN — SODIUM CHLORIDE 1000 ML: 9 INJECTION, SOLUTION INTRAVENOUS at 20:28

## 2024-04-01 RX ADMIN — LABETALOL HYDROCHLORIDE 10 MG: 5 INJECTION INTRAVENOUS at 20:28

## 2024-04-01 RX ADMIN — ESMOLOL HYDROCHLORIDE 50 MCG/KG/MIN: 10 INJECTION INTRAVENOUS at 20:49

## 2024-04-01 RX ADMIN — SODIUM CHLORIDE 5 MG/HR: 9 INJECTION, SOLUTION INTRAVENOUS at 22:38

## 2024-04-01 ASSESSMENT — PAIN DESCRIPTION - DESCRIPTORS
DESCRIPTORS: TIGHTNESS

## 2024-04-01 ASSESSMENT — ENCOUNTER SYMPTOMS
SHORTNESS OF BREATH: 0
NAUSEA: 1
VOMITING: 0
BOWEL INCONTINENCE: 0
DIARRHEA: 0
BACK PAIN: 1
ABDOMINAL PAIN: 0
COUGH: 0
ABDOMINAL SWELLING: 0
CONSTIPATION: 0

## 2024-04-01 ASSESSMENT — PAIN SCALES - GENERAL
PAINLEVEL_OUTOF10: 8
PAINLEVEL_OUTOF10: 10
PAINLEVEL_OUTOF10: 6
PAINLEVEL_OUTOF10: 2
PAINLEVEL_OUTOF10: 10

## 2024-04-01 ASSESSMENT — PAIN DESCRIPTION - ORIENTATION
ORIENTATION: LOWER
ORIENTATION: MID;LOWER
ORIENTATION: RIGHT;LEFT;LOWER

## 2024-04-01 ASSESSMENT — PAIN DESCRIPTION - LOCATION
LOCATION: BACK

## 2024-04-01 ASSESSMENT — PAIN - FUNCTIONAL ASSESSMENT: PAIN_FUNCTIONAL_ASSESSMENT: 0-10

## 2024-04-01 ASSESSMENT — LIFESTYLE VARIABLES
HOW MANY STANDARD DRINKS CONTAINING ALCOHOL DO YOU HAVE ON A TYPICAL DAY: 1 OR 2
HOW OFTEN DO YOU HAVE A DRINK CONTAINING ALCOHOL: 2-3 TIMES A WEEK

## 2024-04-01 NOTE — ED TRIAGE NOTES
Patient states back pain/ spasms that started while swimming today. States that he also has tingling in left arm. States that he had AAA repair 8 months ago.

## 2024-04-02 VITALS
DIASTOLIC BLOOD PRESSURE: 60 MMHG | TEMPERATURE: 98.3 F | RESPIRATION RATE: 22 BRPM | SYSTOLIC BLOOD PRESSURE: 120 MMHG | WEIGHT: 170 LBS | BODY MASS INDEX: 24.34 KG/M2 | HEIGHT: 70 IN | OXYGEN SATURATION: 98 % | HEART RATE: 68 BPM

## 2024-04-02 NOTE — CONSULTS
acetaminophen (TYLENOL) 325 MG tablet Take 2 tablets by mouth every 4 hours as needed for Pain      ASPIRIN LOW DOSE 81 MG chewable tablet Take 1 tablet by mouth daily 90 tablet 3    metoprolol tartrate (LOPRESSOR) 25 MG tablet Take 0.5 tablets by mouth 2 times daily 180 tablet 3    diclofenac (CATAFLAM) 50 MG tablet Take 1 tablet by mouth 3 times daily 90 tablet 3    albuterol sulfate HFA (VENTOLIN HFA) 108 (90 Base) MCG/ACT inhaler Inhale 2 puffs into the lungs 4 times daily as needed for Wheezing 54 g 5    Glucosamine 500 MG CAPS Take by mouth 3 times daily         Past Surgical History:   Procedure Laterality Date    AORTIC ROOT REPLACEMENT  06/29/2023    CARDIAC SURGERY  06/29/2023    HERNIA REPAIR      ORTHOPEDIC SURGERY         Metal implants or AICD: no    Dye allergy: no     Smoker:  Tobacco Use      Smoking status: Never      Smokeless tobacco: Never      Referred by: No ref. provider found    PCP:Mela Guidry MD Jeffrey Thomas Johnston, MD    Elements of this note have been dictated using speech recognition software. As a result, errors of speech recognition may have occurred.

## 2024-04-02 NOTE — ED NOTES
Patient transported via helicopter to [facility] at this time.      Ben Vega RN  04/02/24 [id number]

## 2024-04-02 NOTE — ED NOTES
TRANSFER - OUT REPORT:    Verbal report given to Caroline Apodaca on Bobby Montero  being transferred to [facility] for urgent transfer       Report consisted of patient's Situation, Background, Assessment and   Recommendations(SBAR).     Information from the following report(s) Nurse Handoff Report, ED SBAR, MAR, Recent Results, and Neuro Assessment was reviewed with the receiving nurse.    [place] Fall Assessment:    Presents to emergency department  because of falls (Syncope, seizure, or loss of consciousness): No  Age > 70: No  Altered Mental Status, Intoxication with alcohol or substance confusion (Disorientation, impaired judgment, poor safety awaremess, or inability to follow instructions): No  Impaired Mobility: Ambulates or transfers with assistive devices or assistance; Unable to ambulate or transer.: No             Lines:   Peripheral IV 04/01/24 Right Antecubital (Active)   Site Assessment Clean, dry & intact 04/01/24 2126   Line Status Normal saline locked 04/01/24 2126   Phlebitis Assessment No symptoms 04/01/24 2126   Infiltration Assessment 0 04/01/24 2126   Dressing Status Clean, dry & intact 04/01/24 2126   Dressing Type Transparent 04/01/24 2126       Peripheral IV 04/01/24 Left Forearm (Active)   Site Assessment Clean, dry & intact 04/01/24 2127   Line Status Infusing 04/01/24 2127   Phlebitis Assessment No symptoms 04/01/24 2127   Infiltration Assessment 0 04/01/24 2127   Dressing Status Clean, dry & intact 04/01/24 2127   Dressing Type Transparent 04/01/24 2127        Opportunity for questions and clarification was provided.      Patient transported with:  [facility]           Ben Vega RN  04/02/24 0028

## 2024-04-02 NOTE — ED NOTES
[employer] flight arranged to transfer patient to [facility]. Estimated time of arrival from [place]: 2:00 AM.      Idania Gross  04/02/24 0024

## 2024-04-02 NOTE — ED NOTES
MD stated to start the patient's esmolol for blood pressure control and to hold the patient's Cardene. MD stated to notify him if the patient's heart rate drops below 42 during the esmolol infusion.      Ben Vega RN  04/01/24 2044

## 2024-04-02 NOTE — ED NOTES
MD stated to stop the patient's esmolol and start the Cardene infusion at this time.      Ben Vega RN  04/01/24 [id number]

## 2024-04-02 NOTE — ED NOTES
Report given to GAY Hutchinson who is part of the flight nurse team at this time.      Ben Vega RN  04/02/24 0029

## 2024-04-02 NOTE — ED NOTES
Faxed face sheet to [facility] at [phone].     Called [facility] per Dr. Michael Laureano. [facility] phone number: [phone].      Idania Gross  04/01/24 1820    For nurse to call report at [place]: [phone]  Accepting physician:  Dr Dev Gonzalez    Going to [place] [address]          Idania Gross  04/01/24 9221

## 2024-04-02 NOTE — ED NOTES
[facility] transport team arrived at this time. An extra Cardene drip and a 500 mL bag of normal saline was provided to the transport team for if it was needed in route.      Ben Vega RN  04/02/24 1202

## 2024-04-02 NOTE — ED PROVIDER NOTES
Emergency Department Provider Note       PCP: Mela Guidry MD   Age: 49 y.o.   Sex: male     DISPOSITION Decision To Admit 04/01/2024 09:35:13 PM       ICD-10-CM    1. Descending thoracic aortic dissection (HCC)  I71.012           Medical Decision Making     Patient with previous ascending aortic aneurysm repair with graft this past June at the [facility].  Presents with bilateral thoracic back pain progressively worsening over the previous 2 hours prior to arrival.  He has a descending thoracic aortic dissection extending to the iliacs.  Discussed with vascular surgery who recommends tight blood pressure control and admission to the hospitalist to the ICU.  He will evaluate the patient.    Pt is bradycardic. Will switch to cardene.      1 or more acute illnesses that pose a threat to life or bodily function.   Prescription drug management performed.  Drug therapy given requiring intensive monitoring for toxicity.  Discussion with external consultants.  Shared medical decision making was utilized in creating the patients health plan today.    I independently ordered and reviewed each unique test.  I reviewed external records: provider visit note from outside specialist.   The patients assessment required an independent historian: wife.  The reason they were needed is important historical information not provided by the patient.  I interpreted the CT Scan aortic dissection.  My Independent EKG Interpretation: sinus rhythm, no evidence of arrhythmia      ST Segments:Nonspecific ST segments - NO STEMI   Rate: 68  The patient was admitted and I have discussed patient management with the admitting provider.  The management of this patient was discussed with an external consultant.        Critical care procedure note : 55 minutes of critical care time was performed in the emergency department. This was separate from any other procedures listed during the patients emergency department course. The failure to

## 2024-04-08 ENCOUNTER — OFFICE VISIT (OUTPATIENT)
Dept: FAMILY MEDICINE CLINIC | Facility: CLINIC | Age: 49
End: 2024-04-08
Payer: COMMERCIAL

## 2024-04-08 VITALS
WEIGHT: 171 LBS | TEMPERATURE: 98 F | DIASTOLIC BLOOD PRESSURE: 80 MMHG | OXYGEN SATURATION: 97 % | SYSTOLIC BLOOD PRESSURE: 132 MMHG | BODY MASS INDEX: 24.48 KG/M2 | HEART RATE: 76 BPM | HEIGHT: 70 IN

## 2024-04-08 DIAGNOSIS — T81.30XA WOUND DEHISCENCE: ICD-10-CM

## 2024-04-08 DIAGNOSIS — Z09 HOSPITAL DISCHARGE FOLLOW-UP: Primary | ICD-10-CM

## 2024-04-08 DIAGNOSIS — I71.03 DISSECTION OF THORACOABDOMINAL AORTA (HCC): ICD-10-CM

## 2024-04-08 PROCEDURE — 99214 OFFICE O/P EST MOD 30 MIN: CPT | Performed by: FAMILY MEDICINE

## 2024-04-08 RX ORDER — GENTAMICIN SULFATE 1 MG/G
OINTMENT TOPICAL
Qty: 30 G | Refills: 1 | Status: SHIPPED | OUTPATIENT
Start: 2024-04-08 | End: 2024-04-15

## 2024-04-08 RX ORDER — OXYCODONE HYDROCHLORIDE 5 MG/1
5 TABLET ORAL EVERY 4 HOURS PRN
COMMUNITY

## 2024-04-08 RX ORDER — AMLODIPINE BESYLATE 5 MG/1
5 TABLET ORAL DAILY
COMMUNITY

## 2024-04-08 RX ORDER — SENNA AND DOCUSATE SODIUM 50; 8.6 MG/1; MG/1
1 TABLET, FILM COATED ORAL DAILY
COMMUNITY

## 2024-04-08 SDOH — ECONOMIC STABILITY: FOOD INSECURITY: WITHIN THE PAST 12 MONTHS, THE FOOD YOU BOUGHT JUST DIDN'T LAST AND YOU DIDN'T HAVE MONEY TO GET MORE.: NEVER TRUE

## 2024-04-08 SDOH — ECONOMIC STABILITY: FOOD INSECURITY: WITHIN THE PAST 12 MONTHS, YOU WORRIED THAT YOUR FOOD WOULD RUN OUT BEFORE YOU GOT MONEY TO BUY MORE.: NEVER TRUE

## 2024-04-08 SDOH — ECONOMIC STABILITY: INCOME INSECURITY: HOW HARD IS IT FOR YOU TO PAY FOR THE VERY BASICS LIKE FOOD, HOUSING, MEDICAL CARE, AND HEATING?: NOT HARD AT ALL

## 2024-04-08 SDOH — ECONOMIC STABILITY: TRANSPORTATION INSECURITY
IN THE PAST 12 MONTHS, HAS LACK OF TRANSPORTATION KEPT YOU FROM MEETINGS, WORK, OR FROM GETTING THINGS NEEDED FOR DAILY LIVING?: NO

## 2024-04-08 ASSESSMENT — PATIENT HEALTH QUESTIONNAIRE - PHQ9
SUM OF ALL RESPONSES TO PHQ9 QUESTIONS 1 & 2: 0
1. LITTLE INTEREST OR PLEASURE IN DOING THINGS: NOT AT ALL
SUM OF ALL RESPONSES TO PHQ QUESTIONS 1-9: 0
2. FEELING DOWN, DEPRESSED OR HOPELESS: NOT AT ALL

## 2024-04-08 ASSESSMENT — ENCOUNTER SYMPTOMS
RESPIRATORY NEGATIVE: 1
SHORTNESS OF BREATH: 0
WHEEZING: 0
ABDOMINAL PAIN: 1
COUGH: 0

## 2024-04-08 NOTE — PROGRESS NOTES
this visit:    Hospital discharge follow-up patient is status post discharge from [facility] after a dissecting aortic aneurysm was repaired with a TEVAR . Has f/u in 3 wks at [facility].  Notes reviewed including labs and imaging.    Dissection of thoracoabdominal aorta (HCC)-status post TEVAR.  Patient is doing well.  I did recommend he call his cardiologist here in [place] for follow-up.  Discussed with him that he needs to take it easy and not lift anything heavy.  He will need to discuss his gym workouts going forward with vascular to find out the extent of what he can do in the future.  Currently he still has swelling and tenderness in his left groin.  Recommended ice twice a day to his groin swelling .  Discussed that this should resolve but may take some time.    Wound dehiscence-left lower wound incision is open after Steri-Strips came off.  Patient is to clean it twice a day with peroxide and apply gentamicin ointment to the wound and cover it.  This should heal by secondary intention but if he has any increased redness, pain, swelling fevers or chills he is to follow-up immediately.  -     gentamicin (GARAMYCIN) 0.1 % ointment; Apply topically 3 times daily.         Return if symptoms worsen or fail to improve, for Patient has follow-up in September.         Mela Guidry MD

## 2024-04-10 DIAGNOSIS — I71.03 DISSECTION OF THORACOABDOMINAL AORTA (HCC): ICD-10-CM

## 2024-04-10 DIAGNOSIS — Z86.79: Primary | ICD-10-CM

## 2024-04-11 ENCOUNTER — OFFICE VISIT (OUTPATIENT)
Dept: VASCULAR SURGERY | Age: 49
End: 2024-04-11

## 2024-04-11 VITALS
HEIGHT: 70 IN | DIASTOLIC BLOOD PRESSURE: 68 MMHG | BODY MASS INDEX: 23.34 KG/M2 | SYSTOLIC BLOOD PRESSURE: 112 MMHG | HEART RATE: 83 BPM | OXYGEN SATURATION: 97 % | WEIGHT: 163 LBS

## 2024-04-11 DIAGNOSIS — T81.30XA WOUND DEHISCENCE: Primary | ICD-10-CM

## 2024-04-11 PROCEDURE — 99024 POSTOP FOLLOW-UP VISIT: CPT | Performed by: STUDENT IN AN ORGANIZED HEALTH CARE EDUCATION/TRAINING PROGRAM

## 2024-04-11 RX ORDER — CEPHALEXIN 500 MG/1
500 CAPSULE ORAL 4 TIMES DAILY
Qty: 40 CAPSULE | Refills: 0 | Status: SHIPPED | OUTPATIENT
Start: 2024-04-11 | End: 2024-04-21

## 2024-04-11 NOTE — PROGRESS NOTES
VASCULAR SURGERY   [address] [address]  [phone] FAX: [phone]        Bobby Montero  : 1975    Reason for visit: Follow-up after TEVAR for type B aortic dissection with malperfusion at [facility]    Chief Complaint: 49 y.o. male presents for follow-up after TEVAR for type B aortic dissection with malperfusion at [facility] on .  Patient reports elevated temperature at 100.3 yesterday by temporal thermometer.  Denies dysuria, leg swelling, SOB.  Patient also reports some intermittent back pain.  Patient reports that bowel movements are improving and good urine output.  Patient with dehiscence of inferior left groin wound.  I presume wound is from attempted percutaneous access.  Patient reports wound opened and has drained clear fluid.  No surrounding erythema or purulent drainage to suggest infection.    Plan:   Left groin wound dehiscence: Prophylactic Keflex for left groin wound.   TEVAR for type B dissection: Patient is awaiting televisit with [facility] to discuss postoperative imaging.  Will f/u their recommendations.    Level 3    Constitutional:   Negative for fevers and unexplained weight loss.  Eyes:   Negative for visual disturbance.  ENT:   Negative for significant hearing loss and tinnitus.  Respiratory:   Negative for hemoptysis.  Cardiovascular:   Negative except as noted in HPI.  Gastrointestinal:   Negative for melena and abdominal pain.  Genitourinary:   Negative for hematuria, renal stones.  Integumentary:   Negative for rash or non-healing wounds  Hematologic/Lymphatic:   Negative for excessive bleeding hx or clotting disorder.  Musculoskeletal:  Negative for active, unexplained/severe joint pain.  Neurological:   Negative for stroke.    Behavioral/Psych:   Negative for suicidal ideations.    Endocrine:   Negative for uncontrolled diabetic symptoms including polyuria, polydipsia and poor wound healing.     Physical

## 2024-04-12 ENCOUNTER — NURSE ONLY (OUTPATIENT)
Dept: FAMILY MEDICINE CLINIC | Facility: CLINIC | Age: 49
End: 2024-04-12

## 2024-04-12 DIAGNOSIS — E83.51 HYPOCALCEMIA: ICD-10-CM

## 2024-04-12 DIAGNOSIS — I71.03 DISSECTION OF THORACOABDOMINAL AORTA (HCC): Primary | ICD-10-CM

## 2024-04-12 DIAGNOSIS — I71.03 DISSECTION OF THORACOABDOMINAL AORTA (HCC): ICD-10-CM

## 2024-04-12 LAB
ALBUMIN SERPL-MCNC: 3.1 G/DL (ref 3.5–5)
ALBUMIN/GLOB SERPL: 0.7 (ref 0.4–1.6)
ALP SERPL-CCNC: 73 U/L (ref 50–136)
ALT SERPL-CCNC: 85 U/L (ref 12–65)
ANION GAP SERPL CALC-SCNC: 2 MMOL/L (ref 2–11)
AST SERPL-CCNC: 39 U/L (ref 15–37)
BASOPHILS # BLD: 0.1 K/UL (ref 0–0.2)
BASOPHILS NFR BLD: 1 % (ref 0–2)
BILIRUB SERPL-MCNC: 0.4 MG/DL (ref 0.2–1.1)
BUN SERPL-MCNC: 26 MG/DL (ref 6–23)
CALCIUM SERPL-MCNC: 9.3 MG/DL (ref 8.3–10.4)
CHLORIDE SERPL-SCNC: 103 MMOL/L (ref 103–113)
CO2 SERPL-SCNC: 28 MMOL/L (ref 21–32)
CREAT SERPL-MCNC: 1.5 MG/DL (ref 0.8–1.5)
DIFFERENTIAL METHOD BLD: ABNORMAL
EOSINOPHIL # BLD: 0.2 K/UL (ref 0–0.8)
EOSINOPHIL NFR BLD: 2 % (ref 0.5–7.8)
ERYTHROCYTE [DISTWIDTH] IN BLOOD BY AUTOMATED COUNT: 12.2 % (ref 11.9–14.6)
GLOBULIN SER CALC-MCNC: 4.7 G/DL (ref 2.8–4.5)
GLUCOSE SERPL-MCNC: 98 MG/DL (ref 65–100)
HCT VFR BLD AUTO: 40.8 % (ref 41.1–50.3)
HGB BLD-MCNC: 13.2 G/DL (ref 13.6–17.2)
IMM GRANULOCYTES # BLD AUTO: 0.1 K/UL (ref 0–0.5)
IMM GRANULOCYTES NFR BLD AUTO: 1 % (ref 0–5)
LYMPHOCYTES # BLD: 1.1 K/UL (ref 0.5–4.6)
LYMPHOCYTES NFR BLD: 13 % (ref 13–44)
MCH RBC QN AUTO: 29.9 PG (ref 26.1–32.9)
MCHC RBC AUTO-ENTMCNC: 32.4 G/DL (ref 31.4–35)
MCV RBC AUTO: 92.3 FL (ref 82–102)
MONOCYTES # BLD: 0.8 K/UL (ref 0.1–1.3)
MONOCYTES NFR BLD: 9 % (ref 4–12)
NEUTS SEG # BLD: 6.7 K/UL (ref 1.7–8.2)
NEUTS SEG NFR BLD: 74 % (ref 43–78)
NRBC # BLD: 0 K/UL (ref 0–0.2)
PLATELET # BLD AUTO: 385 K/UL (ref 150–450)
PMV BLD AUTO: 9.3 FL (ref 9.4–12.3)
POTASSIUM SERPL-SCNC: 4.8 MMOL/L (ref 3.5–5.1)
PROT SERPL-MCNC: 7.8 G/DL (ref 6.3–8.2)
RBC # BLD AUTO: 4.42 M/UL (ref 4.23–5.6)
SODIUM SERPL-SCNC: 133 MMOL/L (ref 136–146)
WBC # BLD AUTO: 9 K/UL (ref 4.3–11.1)

## 2024-04-14 DIAGNOSIS — I71.03 DISSECTION OF THORACOABDOMINAL AORTA (HCC): Primary | ICD-10-CM

## 2024-04-15 DIAGNOSIS — I71.03 DISSECTION OF THORACOABDOMINAL AORTA (HCC): Primary | ICD-10-CM

## 2024-04-16 LAB — CA-I SERPL ISE-MCNC: 4.9 MG/DL (ref 4.5–5.6)

## 2024-04-18 ENCOUNTER — OFFICE VISIT (OUTPATIENT)
Dept: VASCULAR SURGERY | Age: 49
End: 2024-04-18

## 2024-04-18 VITALS
SYSTOLIC BLOOD PRESSURE: 113 MMHG | OXYGEN SATURATION: 98 % | DIASTOLIC BLOOD PRESSURE: 69 MMHG | BODY MASS INDEX: 23.34 KG/M2 | HEIGHT: 70 IN | HEART RATE: 76 BPM | WEIGHT: 163 LBS

## 2024-04-18 DIAGNOSIS — Z98.890 HX OF REPAIR OF DISSECTING THORACIC AORTIC ANEURYSM, STANFORD TYPE B: ICD-10-CM

## 2024-04-18 DIAGNOSIS — Z86.79 HX OF REPAIR OF DISSECTING THORACIC AORTIC ANEURYSM, STANFORD TYPE B: ICD-10-CM

## 2024-04-18 DIAGNOSIS — T81.30XA WOUND DEHISCENCE: Primary | ICD-10-CM

## 2024-04-18 PROCEDURE — 99024 POSTOP FOLLOW-UP VISIT: CPT | Performed by: STUDENT IN AN ORGANIZED HEALTH CARE EDUCATION/TRAINING PROGRAM

## 2024-04-18 NOTE — PROGRESS NOTES
VASCULAR SURGERY   [address] [address]  [phone] FAX: [phone]        Bobby Montero  : 1975    Reason for visit: Follow-up after TEVAR for type B aortic dissection with malperfusion at [facility]    Chief Complaint: 49 y.o. male presents for follow-up after TEVAR for type B aortic dissection with malperfusion at [facility] on .  Afebrile. Clear drainage from groin.  Wound tunneling becoming smaller.  Brief episode of green drainage that resolved. No surrounding erythema or purulent drainage.     Plan:   Left groin wound dehiscence: 3 more days of Keflex for left groin wound.   TEVAR for type B dissection: Post-op TEVAR  CTA chest/abd/pelvis. Call with results and forward to Dr. Almanzar with the [facility] for appt .     Global    Constitutional:   Negative for fevers and unexplained weight loss.  Eyes:   Negative for visual disturbance.  ENT:   Negative for significant hearing loss and tinnitus.  Respiratory:   Negative for hemoptysis.  Cardiovascular:   Negative except as noted in HPI.  Gastrointestinal:   Negative for melena and abdominal pain.  Genitourinary:   Negative for hematuria, renal stones.  Integumentary:   Negative for rash or non-healing wounds  Hematologic/Lymphatic:   Negative for excessive bleeding hx or clotting disorder.  Musculoskeletal:  Negative for active, unexplained/severe joint pain.  Neurological:   Negative for stroke.    Behavioral/Psych:   Negative for suicidal ideations.    Endocrine:   Negative for uncontrolled diabetic symptoms including polyuria, polydipsia and poor wound healing.     Physical Examination:   Height: 1.778 m (5' 10\"), Weight - Scale: 73.9 kg (163 lb), BP: 113/69    General: No acute distress  HENT: AT  CV: Regular rhythm.    LUNG: No respiratory distress on RA  Abdominal: No distension.  Extremities:  left groin incision without swelling, induration, and minimal clear drianage

## 2024-04-24 ENCOUNTER — TELEPHONE (OUTPATIENT)
Dept: VASCULAR SURGERY | Age: 49
End: 2024-04-24

## 2024-04-24 NOTE — TELEPHONE ENCOUNTER
Mr. Rosales called to check on CTA Scheduling requested after his last visit with Dr. Laureano. I explained we usually give out the number for pt to schedule. He didn't remember getting the number. I gave him the number ([phone]) and he will call today schedule it today.

## 2024-04-29 DIAGNOSIS — I71.03 DISSECTION OF THORACOABDOMINAL AORTA (HCC): ICD-10-CM

## 2024-04-29 LAB
ALBUMIN SERPL-MCNC: 3.4 G/DL (ref 3.5–5)
ALBUMIN/GLOB SERPL: 0.9 (ref 1–1.9)
ALP SERPL-CCNC: 87 U/L (ref 40–129)
ALT SERPL-CCNC: 27 U/L (ref 12–65)
ANION GAP SERPL CALC-SCNC: 10 MMOL/L (ref 9–18)
AST SERPL-CCNC: 23 U/L (ref 15–37)
BASOPHILS # BLD: 0 K/UL (ref 0–0.2)
BASOPHILS NFR BLD: 1 % (ref 0–2)
BILIRUB SERPL-MCNC: 0.5 MG/DL (ref 0–1.2)
BUN SERPL-MCNC: 22 MG/DL (ref 6–23)
CALCIUM SERPL-MCNC: 9.2 MG/DL (ref 8.8–10.2)
CHLORIDE SERPL-SCNC: 101 MMOL/L (ref 98–107)
CO2 SERPL-SCNC: 27 MMOL/L (ref 20–28)
CREAT SERPL-MCNC: 1.09 MG/DL (ref 0.8–1.3)
DIFFERENTIAL METHOD BLD: ABNORMAL
EOSINOPHIL # BLD: 0.3 K/UL (ref 0–0.8)
EOSINOPHIL NFR BLD: 4 % (ref 0.5–7.8)
ERYTHROCYTE [DISTWIDTH] IN BLOOD BY AUTOMATED COUNT: 12.2 % (ref 11.9–14.6)
GLOBULIN SER CALC-MCNC: 3.9 G/DL (ref 2.3–3.5)
GLUCOSE SERPL-MCNC: 85 MG/DL (ref 70–99)
HCT VFR BLD AUTO: 38.1 % (ref 41.1–50.3)
HGB BLD-MCNC: 12.4 G/DL (ref 13.6–17.2)
IMM GRANULOCYTES # BLD AUTO: 0 K/UL (ref 0–0.5)
IMM GRANULOCYTES NFR BLD AUTO: 1 % (ref 0–5)
LYMPHOCYTES # BLD: 1.4 K/UL (ref 0.5–4.6)
LYMPHOCYTES NFR BLD: 21 % (ref 13–44)
MCH RBC QN AUTO: 28.9 PG (ref 26.1–32.9)
MCHC RBC AUTO-ENTMCNC: 32.5 G/DL (ref 31.4–35)
MCV RBC AUTO: 88.8 FL (ref 82–102)
MONOCYTES # BLD: 0.7 K/UL (ref 0.1–1.3)
MONOCYTES NFR BLD: 11 % (ref 4–12)
NEUTS SEG # BLD: 4 K/UL (ref 1.7–8.2)
NEUTS SEG NFR BLD: 62 % (ref 43–78)
NRBC # BLD: 0 K/UL (ref 0–0.2)
PLATELET # BLD AUTO: 236 K/UL (ref 150–450)
PMV BLD AUTO: 9 FL (ref 9.4–12.3)
POTASSIUM SERPL-SCNC: 4.6 MMOL/L (ref 3.5–5.1)
PROT SERPL-MCNC: 7.3 G/DL (ref 6.3–8.2)
RBC # BLD AUTO: 4.29 M/UL (ref 4.23–5.6)
SODIUM SERPL-SCNC: 138 MMOL/L (ref 136–145)
WBC # BLD AUTO: 6.4 K/UL (ref 4.3–11.1)

## 2024-05-05 DIAGNOSIS — D64.9 ANEMIA, UNSPECIFIED TYPE: Primary | ICD-10-CM

## 2024-05-09 ENCOUNTER — HOSPITAL ENCOUNTER (OUTPATIENT)
Dept: CT IMAGING | Age: 49
Discharge: HOME | End: 2024-05-09
Attending: STUDENT IN AN ORGANIZED HEALTH CARE EDUCATION/TRAINING PROGRAM
Payer: COMMERCIAL

## 2024-05-09 DIAGNOSIS — Z98.890 HX OF REPAIR OF DISSECTING THORACIC AORTIC ANEURYSM, STANFORD TYPE B: ICD-10-CM

## 2024-05-09 DIAGNOSIS — T81.30XA WOUND DEHISCENCE: ICD-10-CM

## 2024-05-09 DIAGNOSIS — Z86.79 HX OF REPAIR OF DISSECTING THORACIC AORTIC ANEURYSM, STANFORD TYPE B: ICD-10-CM

## 2024-05-09 PROCEDURE — 74174 CTA ABD&PLVS W/CONTRAST: CPT | Performed by: RADIOLOGY

## 2024-05-09 PROCEDURE — 71275 CT ANGIOGRAPHY CHEST: CPT | Performed by: RADIOLOGY

## 2024-05-09 PROCEDURE — 74174 CTA ABD&PLVS W/CONTRAST: CPT

## 2024-05-09 PROCEDURE — 6360000004 HC RX CONTRAST MEDICATION: Performed by: STUDENT IN AN ORGANIZED HEALTH CARE EDUCATION/TRAINING PROGRAM

## 2024-05-09 RX ADMIN — IOPAMIDOL 100 ML: 755 INJECTION, SOLUTION INTRAVENOUS at 08:04

## 2024-05-21 ENCOUNTER — TELEPHONE (OUTPATIENT)
Dept: VASCULAR SURGERY | Age: 49
End: 2024-05-21

## 2024-06-04 ENCOUNTER — TELEPHONE (OUTPATIENT)
Dept: VASCULAR SURGERY | Age: 49
End: 2024-06-04

## 2024-06-20 ENCOUNTER — OFFICE VISIT (OUTPATIENT)
Dept: NEUROLOGY | Age: 49
End: 2024-06-20
Payer: COMMERCIAL

## 2024-06-20 VITALS
DIASTOLIC BLOOD PRESSURE: 69 MMHG | WEIGHT: 174.2 LBS | OXYGEN SATURATION: 98 % | SYSTOLIC BLOOD PRESSURE: 127 MMHG | HEIGHT: 70 IN | BODY MASS INDEX: 24.94 KG/M2 | HEART RATE: 58 BPM

## 2024-06-20 DIAGNOSIS — Z86.79 HISTORY OF DISSECTING ABDOMINAL AORTIC ANEURYSM (AAA) REPAIR: ICD-10-CM

## 2024-06-20 DIAGNOSIS — R51.9 NEW ONSET OF HEADACHES: Primary | ICD-10-CM

## 2024-06-20 DIAGNOSIS — Z98.890 HISTORY OF DISSECTING ABDOMINAL AORTIC ANEURYSM (AAA) REPAIR: ICD-10-CM

## 2024-06-20 PROBLEM — I71.03 DISSECTION OF AORTA, THORACOABDOMINAL (HCC): Status: ACTIVE | Noted: 2024-04-02

## 2024-06-20 PROCEDURE — 99204 OFFICE O/P NEW MOD 45 MIN: CPT | Performed by: NURSE PRACTITIONER

## 2024-06-20 RX ORDER — PROPRANOLOL HCL 60 MG
60 CAPSULE, EXTENDED RELEASE 24HR ORAL DAILY
Qty: 30 CAPSULE | Refills: 3 | Status: SHIPPED | OUTPATIENT
Start: 2024-06-20

## 2024-06-20 ASSESSMENT — PATIENT HEALTH QUESTIONNAIRE - PHQ9
SUM OF ALL RESPONSES TO PHQ QUESTIONS 1-9: 0
SUM OF ALL RESPONSES TO PHQ9 QUESTIONS 1 & 2: 0
SUM OF ALL RESPONSES TO PHQ QUESTIONS 1-9: 0
1. LITTLE INTEREST OR PLEASURE IN DOING THINGS: NOT AT ALL
2. FEELING DOWN, DEPRESSED OR HOPELESS: NOT AT ALL

## 2024-06-20 ASSESSMENT — ENCOUNTER SYMPTOMS
GASTROINTESTINAL NEGATIVE: 1
RESPIRATORY NEGATIVE: 1
ALLERGIC/IMMUNOLOGIC NEGATIVE: 1
EYES NEGATIVE: 1

## 2024-06-20 NOTE — PROGRESS NOTES
Patient: Bobby Montero  MRN: [id number]  : 1975    CC: Headaches    HPI:   Bobby Montero is a 49 y.o.yo male here for new patient evaluation for headaches.  Referred by     He is here today with his spouse. RHM. Currently works as [employer] nurse. Denies headache today. Headaches are located left sided at the crown of his head.  They began few weeks ago.  The current frequency of headaches: ~15-20.  Duration: 5-10 seconds.  Severity is 4/10. Quality of headaches are described as sharp.  Denies thunderclap, worsening with positional changes, visual changes, photophobia, phonophobia, GI distress, rhinorrhea, excessive tearing. No other associated symptoms.  The headaches are exacerbated by noise.  He has not taken any medications or interventions for his headaches.       1-2 cup of coffee and a 1 zip fizz drink. Drinks 32-48 oz of water daily. Drinks ~2 beers a week. Denies tobacco or recreational drug use.     Endorses increased anxiety due to his medical condition. Previously tried on sertraline, however was not efficacious. He is not currently on any medication for his anxiety and does not follow psychiatry or counseling. Denies depression or SI.     Family medical history: mother has hx of cerebral aneurysm; brother with epilepsy.     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     Previous Testing: none      Review of Systems   Review of Systems   Constitutional: Negative.    HENT: Negative.     Eyes: Negative.    Respiratory: Negative.     Cardiovascular: Negative.    Gastrointestinal: Negative.    Endocrine: Negative.    Genitourinary: Negative.    Musculoskeletal: Negative.    Skin: Negative.

## 2024-06-20 NOTE — PATIENT INSTRUCTIONS
Headache Education:   Instructed the patient on over-the-counter headache management medications including: CoQ10, magnesium oxide,riboflavin and butterbur.  To avoid a pain medication overuse headache trying not to take pain medicines more than 3 doses a week.   Avoid use of Fioricet or opioids to treat headaches as this can increase risk for rebound headaches.   To help relieve headache symptoms without taking pain medicine lie down under darkroom and drink glass of water.  Consider lifestyle modification including good sleep hygiene, routine medial schedules, regular exercise and managing triggers.  Keep a headache diary  to reveal triggers and possible patterns.  Triggers may be: Food, stress, perfumes, alcohol, or even chocolate.  Drink plenty of water and try to get 8 hours of sleep each night to reduce risk factors that may cause headaches.

## 2024-07-01 ENCOUNTER — HOSPITAL ENCOUNTER (OUTPATIENT)
Dept: CT IMAGING | Age: 49
Discharge: HOME | End: 2024-07-03
Payer: COMMERCIAL

## 2024-07-01 ENCOUNTER — HOSPITAL ENCOUNTER (OUTPATIENT)
Age: 49
Discharge: HOME | End: 2024-07-03
Payer: COMMERCIAL

## 2024-07-01 DIAGNOSIS — R51.9 NEW ONSET OF HEADACHES: ICD-10-CM

## 2024-07-01 PROCEDURE — 6360000004 HC RX CONTRAST MEDICATION: Performed by: NURSE PRACTITIONER

## 2024-07-01 PROCEDURE — 70496 CT ANGIOGRAPHY HEAD: CPT | Performed by: RADIOLOGY

## 2024-07-01 PROCEDURE — 70498 CT ANGIOGRAPHY NECK: CPT | Performed by: RADIOLOGY

## 2024-07-01 PROCEDURE — 70496 CT ANGIOGRAPHY HEAD: CPT

## 2024-07-01 PROCEDURE — 70551 MRI BRAIN STEM W/O DYE: CPT

## 2024-07-01 RX ADMIN — IOPAMIDOL 50 ML: 755 INJECTION, SOLUTION INTRAVENOUS at 12:58

## 2024-07-01 NOTE — RESULT ENCOUNTER NOTE
Spoke with patient relaying providers message below;    Rocio Vick, Ashley Calvin MA  Cc: Ashley Castellon LPN  MRI of brain is normal.

## 2024-07-01 NOTE — RESULT ENCOUNTER NOTE
Spoke with patient relaying providers message below:    Rocio Vick, Ashley Calvin MA  Cc: Ashley Castellon LPN  CTA of head/neck negative for intracranial aneurysm; stable aneurysmal right subclavian artery, containing a complex dissection flap. He is already followed by vascular surgery.

## 2024-08-13 ASSESSMENT — ENCOUNTER SYMPTOMS
ALLERGIC/IMMUNOLOGIC NEGATIVE: 1
EYES NEGATIVE: 1
GASTROINTESTINAL NEGATIVE: 1
RESPIRATORY NEGATIVE: 1

## 2024-08-14 ENCOUNTER — OFFICE VISIT (OUTPATIENT)
Dept: NEUROLOGY | Age: 49
End: 2024-08-14
Payer: COMMERCIAL

## 2024-08-14 VITALS
WEIGHT: 172 LBS | SYSTOLIC BLOOD PRESSURE: 105 MMHG | OXYGEN SATURATION: 98 % | RESPIRATION RATE: 16 BRPM | BODY MASS INDEX: 24.62 KG/M2 | HEIGHT: 70 IN | DIASTOLIC BLOOD PRESSURE: 51 MMHG | HEART RATE: 51 BPM

## 2024-08-14 DIAGNOSIS — G44.85 PRIMARY STABBING HEADACHE: Primary | ICD-10-CM

## 2024-08-14 PROCEDURE — 99214 OFFICE O/P EST MOD 30 MIN: CPT | Performed by: NURSE PRACTITIONER

## 2024-08-14 RX ORDER — PROPRANOLOL HCL 60 MG
60 CAPSULE, EXTENDED RELEASE 24HR ORAL DAILY
Qty: 90 CAPSULE | Refills: 1 | Status: SHIPPED | OUTPATIENT
Start: 2024-08-14 | End: 2024-08-14 | Stop reason: DRUGHIGH

## 2024-08-14 RX ORDER — PROPRANOLOL HYDROCHLORIDE 20 MG/1
20 TABLET ORAL 2 TIMES DAILY
Qty: 180 TABLET | Refills: 1 | Status: SHIPPED | OUTPATIENT
Start: 2024-08-14

## 2024-08-14 ASSESSMENT — PATIENT HEALTH QUESTIONNAIRE - PHQ9
SUM OF ALL RESPONSES TO PHQ QUESTIONS 1-9: 0
2. FEELING DOWN, DEPRESSED OR HOPELESS: NOT AT ALL
SUM OF ALL RESPONSES TO PHQ9 QUESTIONS 1 & 2: 0
SUM OF ALL RESPONSES TO PHQ QUESTIONS 1-9: 0
SUM OF ALL RESPONSES TO PHQ QUESTIONS 1-9: 0
1. LITTLE INTEREST OR PLEASURE IN DOING THINGS: NOT AT ALL
SUM OF ALL RESPONSES TO PHQ QUESTIONS 1-9: 0

## 2024-08-14 NOTE — PROGRESS NOTES
evidence of intra-axial or extra-axial mass. There is no evidence of midline shift. There is no evidence of cerebral hemorrhage or cerebral edema.    Ventricular system and ambient cisterns are normal.    No focus of diffusion restriction is identified.    Pituitary gland and optic chiasm are unremarkable as visualized. The clivus is normal.    There is no evidence of subdural hematoma.    Calvarium is unremarkable. Sinuses are clear.    Impression  Normal MRI of the brain. However, if aneurysm screening were  desired, would recommend CT angiogram of the head for more definitive evaluation.      Electronically signed by Marlon Nj          Assessment/ Plan:    Bobby was seen today for new patient and headache.    Diagnoses and all orders for this visit:    Primary stabbing headache  MRI and CTA reassuring.   Maintain SBP <140/90  Decrease propranolol 20 mg twice daily for headache prevention. Medication and side effects discussed. Hold propranolol, if BP less than 110 SBP or HR less than 55.     Headache Education:   Instructed the patient on over-the-counter headache management medications including: CoQ10, magnesium oxide, riboflavin and butterbur.  To avoid a pain medication overuse headache trying not to take pain medicines more than 3 doses a week.   Avoid use of Fioricet or opioids to treat headaches as this can increase risk for rebound headaches.   To help relieve headache symptoms without taking pain medicine lie down under darkroom and drink glass of water.  Consider lifestyle modification including good sleep hygiene, routine medial schedules, regular exercise and managing triggers.  Keep a headache diary  to reveal triggers and possible patterns.  Triggers may be: Food, stress, perfumes, alcohol, or even chocolate.  Drink plenty of water and try to get 8 hours of sleep each night to reduce risk factors that may cause headaches.      Follow up as needed .       Rocio Dang

## 2024-09-03 ENCOUNTER — PROCEDURE VISIT (OUTPATIENT)
Dept: NEUROLOGY | Age: 49
End: 2024-09-03
Payer: COMMERCIAL

## 2024-09-03 VITALS — WEIGHT: 174 LBS | HEART RATE: 63 BPM | OXYGEN SATURATION: 96 % | BODY MASS INDEX: 24.97 KG/M2

## 2024-09-03 DIAGNOSIS — M95.8 WINGED SCAPULA OF LEFT SIDE: ICD-10-CM

## 2024-09-03 DIAGNOSIS — G54.5 PARSONAGE-TURNER SYNDROME: Primary | ICD-10-CM

## 2024-09-03 DIAGNOSIS — G56.02 MILD CARPAL TUNNEL SYNDROME, LEFT: ICD-10-CM

## 2024-09-03 DIAGNOSIS — G56.22 CUBITAL TUNNEL SYNDROME ON LEFT: ICD-10-CM

## 2024-09-03 PROCEDURE — 95912 NRV CNDJ TEST 11-12 STUDIES: CPT | Performed by: PSYCHIATRY & NEUROLOGY

## 2024-09-03 PROCEDURE — 95886 MUSC TEST DONE W/N TEST COMP: CPT | Performed by: PSYCHIATRY & NEUROLOGY

## 2024-09-03 NOTE — PROGRESS NOTES
APPEARS TO BE INTACT AT THIS POINT.  NO OTHER LEFT-SIDED BRACHIAL PLEXOPATHY ETC.            CONCLUSION:     Moderate left cubital tunnel syndrome.  Early left carpal tunnel syndrome.  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      Procedure Details:       Correlates with left upper extremity features paresthesias etc. with minimal and only partial evidence of a previous distal plexopathy as well as early carpal tunnel syndrome.  The cubital tunnel syndrome appears to be more moderate.    Patient is made fully aware.            Please Note::     Data and waveforms * filed under Procedure category [facility].    See Procedure Files for complete data pages.       [ *NOTE:  parts or all of this consultation are produced using artificial voice recognition software.  Some speech errors are inherent in such software and may be included in the produced record. ]           Nikolas Euceda MD  [facility]  [place] [address]  Phone:  [phone]  Fax:   [phone]          + + +   Glossary:   MUP: motor unit potential;  SNAP: sensory nerve action potential; Fibs:  Fibrillations;  Fascic: fasciculations; IA: insertional activity;  IP: interference pattern;  SCV :  Sensory conduction velocity;  MCV: motor conduction velocity; NOTE:: muscles are abbreviated latin initials.     [place] raw datafile::   * filed at Procedure or Media Files. *

## 2024-09-19 ENCOUNTER — OFFICE VISIT (OUTPATIENT)
Dept: FAMILY MEDICINE CLINIC | Facility: CLINIC | Age: 49
End: 2024-09-19
Payer: COMMERCIAL

## 2024-09-19 VITALS
DIASTOLIC BLOOD PRESSURE: 76 MMHG | HEART RATE: 63 BPM | BODY MASS INDEX: 24.48 KG/M2 | OXYGEN SATURATION: 98 % | WEIGHT: 171 LBS | HEIGHT: 70 IN | TEMPERATURE: 97.7 F | SYSTOLIC BLOOD PRESSURE: 120 MMHG

## 2024-09-19 DIAGNOSIS — D50.8 OTHER IRON DEFICIENCY ANEMIA: ICD-10-CM

## 2024-09-19 DIAGNOSIS — Z86.79 HISTORY OF DISSECTION OF THORACIC AORTA: ICD-10-CM

## 2024-09-19 DIAGNOSIS — Z00.00 ROUTINE GENERAL MEDICAL EXAMINATION AT A HEALTH CARE FACILITY: Primary | ICD-10-CM

## 2024-09-19 DIAGNOSIS — Z86.79 S/P ASCENDING AORTIC ANEURYSM REPAIR: ICD-10-CM

## 2024-09-19 DIAGNOSIS — Z98.890 S/P ASCENDING AORTIC ANEURYSM REPAIR: ICD-10-CM

## 2024-09-19 DIAGNOSIS — E78.41 ELEVATED LIPOPROTEIN(A): ICD-10-CM

## 2024-09-19 PROCEDURE — 99396 PREV VISIT EST AGE 40-64: CPT | Performed by: FAMILY MEDICINE

## 2024-09-19 RX ORDER — ASPIRIN 81 MG
81 TABLET,CHEWABLE ORAL DAILY
Qty: 90 TABLET | Refills: 3 | Status: SHIPPED | OUTPATIENT
Start: 2024-09-19

## 2024-09-19 ASSESSMENT — ENCOUNTER SYMPTOMS
DIARRHEA: 0
SHORTNESS OF BREATH: 0
ABDOMINAL PAIN: 0
WHEEZING: 0
COUGH: 0
CONSTIPATION: 0
BACK PAIN: 0
CHEST TIGHTNESS: 0

## 2024-09-21 PROBLEM — I71.03 DISSECTION OF AORTA, THORACOABDOMINAL (HCC): Status: RESOLVED | Noted: 2024-04-02 | Resolved: 2024-09-21

## 2024-09-21 PROBLEM — Z86.79 S/P ASCENDING AORTIC ANEURYSM REPAIR: Status: ACTIVE | Noted: 2024-09-21

## 2024-09-21 PROBLEM — E78.41 ELEVATED LIPOPROTEIN(A): Status: ACTIVE | Noted: 2024-09-21

## 2024-09-21 PROBLEM — Z98.890 S/P ASCENDING AORTIC ANEURYSM REPAIR: Status: ACTIVE | Noted: 2024-09-21

## 2024-09-21 PROBLEM — Z86.79 HISTORY OF DISSECTION OF THORACIC AORTA: Status: ACTIVE | Noted: 2024-09-21

## 2024-11-11 DIAGNOSIS — D50.8 OTHER IRON DEFICIENCY ANEMIA: ICD-10-CM

## 2024-11-11 LAB
BASOPHILS # BLD: 0.1 K/UL (ref 0–0.2)
BASOPHILS NFR BLD: 1 % (ref 0–2)
DIFFERENTIAL METHOD BLD: ABNORMAL
EOSINOPHIL # BLD: 0.2 K/UL (ref 0–0.8)
EOSINOPHIL NFR BLD: 3 % (ref 0.5–7.8)
ERYTHROCYTE [DISTWIDTH] IN BLOOD BY AUTOMATED COUNT: 13 % (ref 11.9–14.6)
HCT VFR BLD AUTO: 42.3 % (ref 41.1–50.3)
HGB BLD-MCNC: 13.8 G/DL (ref 13.6–17.2)
IMM GRANULOCYTES # BLD AUTO: 0 K/UL (ref 0–0.5)
IMM GRANULOCYTES NFR BLD AUTO: 1 % (ref 0–5)
IRON SERPL-MCNC: 64 UG/DL (ref 35–100)
LYMPHOCYTES # BLD: 1.3 K/UL (ref 0.5–4.6)
LYMPHOCYTES NFR BLD: 22 % (ref 13–44)
MCH RBC QN AUTO: 29.2 PG (ref 26.1–32.9)
MCHC RBC AUTO-ENTMCNC: 32.6 G/DL (ref 31.4–35)
MCV RBC AUTO: 89.4 FL (ref 82–102)
MONOCYTES # BLD: 0.7 K/UL (ref 0.1–1.3)
MONOCYTES NFR BLD: 11 % (ref 4–12)
NEUTS SEG # BLD: 3.9 K/UL (ref 1.7–8.2)
NEUTS SEG NFR BLD: 62 % (ref 43–78)
NRBC # BLD: 0 K/UL (ref 0–0.2)
PLATELET # BLD AUTO: 203 K/UL (ref 150–450)
PMV BLD AUTO: 9.2 FL (ref 9.4–12.3)
RBC # BLD AUTO: 4.73 M/UL (ref 4.23–5.6)
WBC # BLD AUTO: 6.1 K/UL (ref 4.3–11.1)

## 2025-08-25 DIAGNOSIS — Z98.890 S/P ASCENDING AORTIC ANEURYSM REPAIR: ICD-10-CM

## 2025-08-25 DIAGNOSIS — Z86.79 S/P ASCENDING AORTIC ANEURYSM REPAIR: ICD-10-CM

## 2025-08-26 RX ORDER — ASPIRIN 81 MG
81 TABLET,CHEWABLE ORAL DAILY
Qty: 90 TABLET | Refills: 3 | OUTPATIENT
Start: 2025-08-26

## 2025-08-27 DIAGNOSIS — Z98.890 S/P ASCENDING AORTIC ANEURYSM REPAIR: ICD-10-CM

## 2025-08-27 DIAGNOSIS — Z86.79 S/P ASCENDING AORTIC ANEURYSM REPAIR: ICD-10-CM

## 2025-08-27 RX ORDER — ASPIRIN 81 MG
81 TABLET,CHEWABLE ORAL DAILY
Qty: 90 TABLET | Refills: 3 | Status: SHIPPED | OUTPATIENT
Start: 2025-08-27